# Patient Record
Sex: FEMALE | Race: WHITE | NOT HISPANIC OR LATINO | Employment: FULL TIME | URBAN - NONMETROPOLITAN AREA
[De-identification: names, ages, dates, MRNs, and addresses within clinical notes are randomized per-mention and may not be internally consistent; named-entity substitution may affect disease eponyms.]

---

## 2022-04-13 ENCOUNTER — OFFICE VISIT (OUTPATIENT)
Dept: FAMILY MEDICINE CLINIC | Facility: CLINIC | Age: 27
End: 2022-04-13

## 2022-04-13 VITALS
DIASTOLIC BLOOD PRESSURE: 102 MMHG | BODY MASS INDEX: 47.09 KG/M2 | SYSTOLIC BLOOD PRESSURE: 132 MMHG | HEIGHT: 66 IN | OXYGEN SATURATION: 98 % | WEIGHT: 293 LBS | TEMPERATURE: 97 F | HEART RATE: 110 BPM

## 2022-04-13 DIAGNOSIS — Z86.16 HISTORY OF COVID-19: ICD-10-CM

## 2022-04-13 DIAGNOSIS — E11.9 TYPE 2 DIABETES MELLITUS WITHOUT COMPLICATION, WITHOUT LONG-TERM CURRENT USE OF INSULIN: ICD-10-CM

## 2022-04-13 DIAGNOSIS — N96 HISTORY OF MULTIPLE MISCARRIAGES: ICD-10-CM

## 2022-04-13 DIAGNOSIS — J45.40 MODERATE PERSISTENT ASTHMA WITHOUT COMPLICATION: ICD-10-CM

## 2022-04-13 DIAGNOSIS — Z00.01 ENCOUNTER FOR PREVENTATIVE ADULT HEALTH CARE EXAM WITH ABNORMAL FINDINGS: Primary | ICD-10-CM

## 2022-04-13 PROCEDURE — 36415 COLL VENOUS BLD VENIPUNCTURE: CPT | Performed by: FAMILY MEDICINE

## 2022-04-13 PROCEDURE — 99385 PREV VISIT NEW AGE 18-39: CPT | Performed by: FAMILY MEDICINE

## 2022-04-13 RX ORDER — AMOXICILLIN AND CLAVULANATE POTASSIUM 875; 125 MG/1; MG/1
1 TABLET, FILM COATED ORAL EVERY 12 HOURS
COMMUNITY
Start: 2022-01-18 | End: 2022-04-13

## 2022-04-13 RX ORDER — ALBUTEROL SULFATE 90 UG/1
2 AEROSOL, METERED RESPIRATORY (INHALATION) EVERY 4 HOURS PRN
Qty: 4 G | Refills: 2 | Status: SHIPPED | OUTPATIENT
Start: 2022-04-13 | End: 2022-07-12

## 2022-04-13 RX ORDER — CEPHALEXIN 500 MG/1
CAPSULE ORAL
COMMUNITY
Start: 2022-01-15 | End: 2022-04-22

## 2022-04-13 NOTE — PROGRESS NOTES
"Chief Complaint  Annual Exam    Subjective          Priti Osman presents to [facility] for preventative yearly exam.   Patient is a 26-year-old female who presents for preventative exam and establishing care.    She is due for a Pap smear.  She also notes that in the past year she has had a total of 5 miscarriages has never had further hypercoagulability or underlying work-up done has not had any type of lupus anticoagulant or anticardiolipin antibodies done.    She had Covid a year ago and since then has had significant trouble with respiratory issues she had no prior history of asthma or breathing issues prior to having Covid.  Since then she had been evaluated initially by pulmonologist who did a PFT testing there is some concern of lung scarring and inflammation she continues to have to use her albuterol rescue inhaler would like to get reestablished with a pulmonologist.    She notes about 3 years ago she was diagnosed with what they called type 2 diabetes this was based off of an elevated hemoglobin A1c they never did any further testing to distinguish type I versus type II.  At that time she was started on oral Metformin along with some insulin she has been off of this for 6 months due to losing insurance.  Otherwise she does check her blood glucose at home runs around 200.  No admission of polydipsia, polyuria or polyphagia.  Has family history of type I and type 2 diabetes.      Objective   Vital Signs:   BP (!) 132/102   Pulse 110   Temp 97 °F (36.1 °C)   Ht 167.6 cm (66\")   Wt (!) 140 kg (309 lb 3.2 oz)   SpO2 98%   BMI 49.91 kg/m²     Body mass index is 49.91 kg/m².    Predictive Model Details   No score data available for Risk of Fall        PHQ-9 Depression Screening  Little interest or pleasure in doing things? 1-->several days   Feeling down, depressed, or hopeless? 1-->several days   Trouble falling or staying asleep, or sleeping too much? 2-->more than half " the days   Feeling tired or having little energy? 3-->nearly every day   Poor appetite or overeating? 0-->not at all   Feeling bad about yourself - or that you are a failure or have let yourself or your family down? 1-->several days   Trouble concentrating on things, such as reading the newspaper or watching television? 2-->more than half the days   Moving or speaking so slowly that other people could have noticed? Or the opposite - being so fidgety or restless that you have been moving around a lot more than usual? 1-->several days   Thoughts that you would be better off dead, or of hurting yourself in some way? 1-->several days   PHQ-9 Total Score 12   If you checked off any problems, how difficult have these problems made it for you to do your work, take care of things at home, or get along with other people? not difficult at all       There is no immunization history on file for this patient.    Review of Systems   Constitutional: Negative.    HENT: Negative.    Eyes: Negative.    Respiratory: Positive for cough.    Cardiovascular: Negative.    Gastrointestinal: Negative.    Endocrine: Negative.    Genitourinary: Negative.    Musculoskeletal: Negative.    Skin: Negative.    Allergic/Immunologic: Negative.    Neurological: Negative.    Hematological: Negative.    Psychiatric/Behavioral: Negative.        Past History:  Medical History: has a past medical history of ADHD, Anxiety, Diabetes, gestational, Insomnia, Neurotic depression, and Obesity.   Surgical History: has a past surgical history that includes Tonsillectomy.   Family History: family history includes ADD / ADHD in her father; Alcohol abuse in her father; Allergies in her sister; Alzheimer's disease in an other family member; Asthma in an other family member; Cancer in an other family member; Clotting disorder in her mother; Depression in her father, mother, sister, and another family member; Diabetes in her mother and another family member; Mental  illness in her father and another family member; Obesity in her mother and another family member; Stroke in an other family member.   Social History: reports that she has never smoked. She has never used smokeless tobacco. She reports previous alcohol use. She reports that she does not use drugs.      Current Outpatient Medications:   •  cephalexin (KEFLEX) 500 MG capsule, TAKE 1 CAPSULE 4 TIMES A DAY, Disp: , Rfl:   •  mupirocin (BACTROBAN) 2 % ointment, APPLY TO AFFECTED AREA(S) 3 TIMES A DAY, Disp: , Rfl:   •  albuterol sulfate  (90 Base) MCG/ACT inhaler, Inhale 2 puffs Every 4 (Four) Hours As Needed for Wheezing., Disp: 4 g, Rfl: 2    Allergies: Patient has no known allergies.    Physical Exam  Constitutional:       Appearance: Normal appearance. She is normal weight.   HENT:      Head: Normocephalic and atraumatic.   Eyes:      Conjunctiva/sclera: Conjunctivae normal.   Cardiovascular:      Rate and Rhythm: Normal rate and regular rhythm.   Pulmonary:      Effort: Pulmonary effort is normal.      Breath sounds: Normal breath sounds.   Musculoskeletal:         General: Normal range of motion.      Cervical back: Normal range of motion and neck supple.   Skin:     General: Skin is warm and dry.   Neurological:      General: No focal deficit present.      Mental Status: She is alert and oriented to person, place, and time. Mental status is at baseline.   Psychiatric:         Mood and Affect: Mood normal.         Behavior: Behavior normal.         Thought Content: Thought content normal.         Judgment: Judgment normal.          Result Review :              Counseling/anticipatory guidance:  Patient has been counseled on nutrition, [place] planning/contraception, physical activity, health and weight, injury prevention, misuse of tobacco, alcohol and drugs, sexual behavior, dental health, mental health, immunizations and screening.  Patient has been given counseling and guidance on the importance of breast  cancer and self breast exams.     Assessment and Plan    Diagnoses and all orders for this visit:    1. Encounter for preventative adult health care exam with abnormal findings (Primary)  We will get basic blood work and follow-up she is also due for a Pap smear  -     CBC Auto Differential; Future  -     CK; Future  -     Comprehensive Metabolic Panel; Future  -     Hemoglobin A1c; Future  -     TSH; Future  -     Lipid Panel; Future  -     T4, Free; Future  -     T4, Free  -     Lipid Panel  -     TSH  -     Hemoglobin A1c  -     Comprehensive Metabolic Panel  -     CK  -     CBC Auto Differential    2. Type 2 diabetes mellitus without complication, without long-term current use of insulin (HCC)  -     C-Peptide; Future  -     Glutamic Acid Decarboxylase; Future  -     Glutamic Acid Decarboxylase  -     C-Peptide  I have discussed with her with her young age there is a need to rule out type I versus type 2 diabetes we will get some further blood work we will get an A1c and follow-up at her next appointment.    3. Moderate persistent asthma without complication  -     XR Chest PA & Lateral; Future  No evidence of respiratory distress or wheezing but will get a chest x-ray and follow-up.  Otherwise ProAir albuterol inhaler has been provided.    4. History of COVID-19    5. History of multiple miscarriages  -     Lupus Anticoagulant; Future  -     Lupus Anticoagulant  Work-up with lupus anticoagulant blood work and follow-up    Other orders  -     albuterol sulfate  (90 Base) MCG/ACT inhaler; Inhale 2 puffs Every 4 (Four) Hours As Needed for Wheezing.  Dispense: 4 g; Refill: 2        PATIENT HAS BEEN ADVISED WHILE ON NEW MEDICATION PRESCRIBED IT IS IMPORTANT TO USE BACK UP CONTRACEPTION OR BACK UP BIRTH CONTROL AS THE USE OF THIS MEDICATION WITH PREGNANCY COULD CAUSE POTENTIAL RISKS IF PATIENT DOES BECOME PREGNANT.    MEDICATION SIDE EFFECTS AND RISKS HAVE BEEN DISCUSSED WITH PATIENT. PATIENT NOTES  UNDERSTANDING. IF ANY CONCERN OR QUESTION REGARDING MEDICATION PLEASE CONTACT.       Follow Up   No follow-ups on file.  Patient was given instructions and counseling regarding her condition or for health maintenance advice. Please see specific information pulled into the AVS if appropriate.     Angle Yoo DO

## 2022-04-14 ENCOUNTER — TELEMEDICINE (OUTPATIENT)
Dept: FAMILY MEDICINE CLINIC | Facility: CLINIC | Age: 27
End: 2022-04-14

## 2022-04-14 DIAGNOSIS — E78.2 MIXED HYPERLIPIDEMIA: ICD-10-CM

## 2022-04-14 DIAGNOSIS — F90.2 ATTENTION DEFICIT HYPERACTIVITY DISORDER (ADHD), COMBINED TYPE: ICD-10-CM

## 2022-04-14 DIAGNOSIS — E11.9 TYPE 2 DIABETES MELLITUS WITHOUT COMPLICATION, WITHOUT LONG-TERM CURRENT USE OF INSULIN: Primary | ICD-10-CM

## 2022-04-14 LAB
ALBUMIN SERPL-MCNC: 4.4 G/DL (ref 3.9–5)
ALBUMIN/GLOB SERPL: 1.4 {RATIO} (ref 1.2–2.2)
ALP SERPL-CCNC: 103 IU/L (ref 44–121)
ALT SERPL-CCNC: 21 IU/L (ref 0–32)
AST SERPL-CCNC: 17 IU/L (ref 0–40)
BASOPHILS # BLD AUTO: 0.1 X10E3/UL (ref 0–0.2)
BASOPHILS NFR BLD AUTO: 1 %
BILIRUB SERPL-MCNC: 0.8 MG/DL (ref 0–1.2)
BUN SERPL-MCNC: 9 MG/DL (ref 6–20)
BUN/CREAT SERPL: 22 (ref 9–23)
C PEPTIDE SERPL-MCNC: 3.6 NG/ML (ref 1.1–4.4)
CALCIUM SERPL-MCNC: 9.4 MG/DL (ref 8.7–10.2)
CHLORIDE SERPL-SCNC: 97 MMOL/L (ref 96–106)
CHOLEST SERPL-MCNC: 197 MG/DL (ref 100–199)
CK SERPL-CCNC: 51 U/L (ref 32–182)
CO2 SERPL-SCNC: 20 MMOL/L (ref 20–29)
CREAT SERPL-MCNC: 0.41 MG/DL (ref 0.57–1)
EGFRCR SERPLBLD CKD-EPI 2021: 141 ML/MIN/1.73
EOSINOPHIL # BLD AUTO: 0.1 X10E3/UL (ref 0–0.4)
EOSINOPHIL NFR BLD AUTO: 1 %
ERYTHROCYTE [DISTWIDTH] IN BLOOD BY AUTOMATED COUNT: 13 % (ref 11.7–15.4)
GLOBULIN SER CALC-MCNC: 3.1 G/DL (ref 1.5–4.5)
GLUCOSE SERPL-MCNC: 270 MG/DL (ref 65–99)
HBA1C MFR BLD: 10.3 % (ref 4.8–5.6)
HCT VFR BLD AUTO: 41.9 % (ref 34–46.6)
HDLC SERPL-MCNC: 39 MG/DL
HGB BLD-MCNC: 13.9 G/DL (ref 11.1–15.9)
IMM GRANULOCYTES # BLD AUTO: 0.1 X10E3/UL (ref 0–0.1)
IMM GRANULOCYTES NFR BLD AUTO: 1 %
LDLC SERPL CALC-MCNC: 118 MG/DL (ref 0–99)
LYMPHOCYTES # BLD AUTO: 2.3 X10E3/UL (ref 0.7–3.1)
LYMPHOCYTES NFR BLD AUTO: 22 %
MCH RBC QN AUTO: 29.2 PG (ref 26.6–33)
MCHC RBC AUTO-ENTMCNC: 33.2 G/DL (ref 31.5–35.7)
MCV RBC AUTO: 88 FL (ref 79–97)
MONOCYTES # BLD AUTO: 0.4 X10E3/UL (ref 0.1–0.9)
MONOCYTES NFR BLD AUTO: 3 %
NEUTROPHILS # BLD AUTO: 7.8 X10E3/UL (ref 1.4–7)
NEUTROPHILS NFR BLD AUTO: 72 %
PLATELET # BLD AUTO: 250 X10E3/UL (ref 150–450)
POTASSIUM SERPL-SCNC: 4.3 MMOL/L (ref 3.5–5.2)
PROT SERPL-MCNC: 7.5 G/DL (ref 6–8.5)
RBC # BLD AUTO: 4.76 X10E6/UL (ref 3.77–5.28)
SODIUM SERPL-SCNC: 135 MMOL/L (ref 134–144)
T4 FREE SERPL-MCNC: 1.37 NG/DL (ref 0.82–1.77)
TRIGL SERPL-MCNC: 226 MG/DL (ref 0–149)
TSH SERPL DL<=0.005 MIU/L-ACNC: 1.74 UIU/ML (ref 0.45–4.5)
VLDLC SERPL CALC-MCNC: 40 MG/DL (ref 5–40)
WBC # BLD AUTO: 10.7 X10E3/UL (ref 3.4–10.8)

## 2022-04-14 PROCEDURE — 99214 OFFICE O/P EST MOD 30 MIN: CPT | Performed by: FAMILY MEDICINE

## 2022-04-14 RX ORDER — BUPROPION HYDROCHLORIDE 150 MG/1
150 TABLET ORAL DAILY
Qty: 30 TABLET | Refills: 0 | Status: SHIPPED | OUTPATIENT
Start: 2022-04-14 | End: 2022-10-21

## 2022-04-14 NOTE — PROGRESS NOTES
Telehealth E-Visit      Date: 2022   Patient Name: Priti Osman  : 1995   MRN: [id number]     Chief Complaint:  No chief complaint on file.    Telehealth Visit completed via  video conferencing, patient here for telehealth visit. Patient understands the limitations of the visit with telehealth given limitations in the exam findings but patient is agreeable to this telemedicine visit due to concerns with COVID 19 exposure if patient were to be present at the office at this time    I have reviewed the E-Visit questionnaire and the patient's answers, my assessment and plan are listed below.     History of Present Illness: Priti Osman is a 26 y.o. female who presents for blood work follow-up.  She had previously been told that she was a diabetic was unsure if it was type I versus type II but had been on metformin and other injectables in the past.  She does try to check her blood glucose daily around 200 but was not aware of what her last hemoglobin A1c was.    She also notes as a child she was diagnosed with ADHD is unsure what medication she was on she struggles with some depression and ADHD like symptoms now with some anxiety no SI or HI noted had previously been on Wellbutrin did well with this.      Subjective      Review of Systems:   Review of Systems   Constitutional: Negative.    HENT: Negative.    Eyes: Negative.    Respiratory: Negative.    Cardiovascular: Negative.    Gastrointestinal: Negative.    Endocrine: Negative.    Genitourinary: Negative.    Musculoskeletal: Negative.    Skin: Negative.    Allergic/Immunologic: Negative.    Neurological: Negative.    Hematological: Negative.    Psychiatric/Behavioral: Negative.  Positive for positive for hyperactivity and depressed mood.       I have reviewed and the following portions of the patient's history were updated as appropriate: past family history, past medical history, past social history, past surgical history and problem  list.    Medications:     Current Outpatient Medications:   •  albuterol sulfate  (90 Base) MCG/ACT inhaler, Inhale 2 puffs Every 4 (Four) Hours As Needed for Wheezing., Disp: 4 g, Rfl: 2  •  buPROPion XL (Wellbutrin XL) 150 MG 24 hr tablet, Take 1 tablet by mouth Daily., Disp: 30 tablet, Rfl: 0  •  cephalexin (KEFLEX) 500 MG capsule, TAKE 1 CAPSULE 4 TIMES A DAY, Disp: , Rfl:   •  metFORMIN (GLUCOPHAGE) 1000 MG tablet, Take 1 tablet by mouth 2 (Two) Times a Day With Meals., Disp: 60 tablet, Rfl: 5  •  mupirocin (BACTROBAN) 2 % ointment, APPLY TO AFFECTED AREA(S) 3 TIMES A DAY, Disp: , Rfl:     Allergies:   No Known Allergies    Objective     Physical Exam:  Vital Signs: There were no vitals filed for this visit.  There is no height or weight on file to calculate BMI.    Physical Exam  Vitals (EXAM IS DONE AND LIMTIED TO TELEMEDICINE DUE TO COVID) reviewed.           Assessment / Plan      Assessment/Plan:   Diagnoses and all orders for this visit:    1. Type 2 diabetes mellitus without complication, without long-term current use of insulin (HCC) (Primary)  I have discussed with patient it appears that her beta glutamic acid level is within normal limits indicating that she likely has type 2 diabetes for now with her young age we will also get her established with endocrinology to get established.  In the meantime I will get her on metformin 1000 mg twice daily and I have also given her samples of Rybelsus to try we will try to approach this without having to use insulin with the GLP-1 approach have told her to keep a good blood glucose log daily twice daily return in 1 months for evaluation.  Otherwise return precautions have been provided glycemic control index range has been provided as well.    2. Mixed hyperlipidemia  For now we will consider adding a statin at the next appointment however have strongly recommended diet and exercise.    3. Attention deficit hyperactivity disorder (ADHD), combined  type  We will start her on trial of Wellbutrin return precautions provided      Other orders  -     buPROPion XL (Wellbutrin XL) 150 MG 24 hr tablet; Take 1 tablet by mouth Daily.  Dispense: 30 tablet; Refill: 0  -     metFORMIN (GLUCOPHAGE) 1000 MG tablet; Take 1 tablet by mouth 2 (Two) Times a Day With Meals.  Dispense: 60 tablet; Refill: 5         Follow Up:   No follow-ups on file.    PATIENT HAS BEEN ADVISED WHILE ON NEW MEDICATION PRESCRIBED IT IS IMPORTANT TO USE BACK UP CONTRACEPTION OR BACK UP BIRTH CONTROL AS THE USE OF THIS MEDICATION WITH PREGNANCY COULD CAUSE POTENTIAL RISKS IF PATIENT DOES BECOME PREGNANT.    MEDICATION SIDE EFFECTS AND RISKS HAVE BEEN DISCUSSED WITH PATIENT. PATIENT NOTES UNDERSTANDING. IF ANY CONCERN OR QUESTION REGARDING MEDICATION PLEASE CONTACT.     50 minutes were spent reviewing the patient's questionnaire, formulating a treatment plan, and relaying information to the patient via TweetPhotohart.    Angle Yoo DO  [facility]   04/14/22  10:37 EDT

## 2022-04-15 ENCOUNTER — TELEPHONE (OUTPATIENT)
Dept: FAMILY MEDICINE CLINIC | Facility: CLINIC | Age: 27
End: 2022-04-15

## 2022-04-15 DIAGNOSIS — R76.0 LUPUS ANTICOAGULANT POSITIVE: Primary | ICD-10-CM

## 2022-04-15 LAB
APTT SCREEN TO CONFIRM RATIO: 1.17 RATIO (ref 0–1.34)
CONFIRM APTT/NORMAL: 43 SEC (ref 0–47.6)
DRVVT SCREEN TO CONFIRM RATIO: 1.4 RATIO (ref 0.8–1.2)
GAD65 AB SER IA-ACNC: <5 U/ML (ref 0–5)
LA 2 SCREEN W REFLEX-IMP: ABNORMAL
MIXING DRVVT: 42.4 SEC (ref 0–40.4)
SCREEN APTT: 28.3 SEC (ref 0–51.9)
SCREEN DRVVT: 49.1 SEC (ref 0–47)
THROMBIN TIME: 15.3 SEC (ref 0–23)

## 2022-04-15 NOTE — TELEPHONE ENCOUNTER
patient called and would like to know if the diabetic samples are at the  so that she can send her  to pick them up. Please call to let her know. Ph: [phone]

## 2022-04-15 NOTE — TELEPHONE ENCOUNTER
Called pt to inform her of her samples being up fornt and she said her  will  her meds on 04/16/2022

## 2022-04-18 ENCOUNTER — TELEPHONE (OUTPATIENT)
Dept: FAMILY MEDICINE CLINIC | Facility: CLINIC | Age: 27
End: 2022-04-18

## 2022-04-18 NOTE — TELEPHONE ENCOUNTER
Patient sent me a message she states she has tried to call her office multiple different times.  Can 70 please call her and notify her that the blood work results that were abnormal were her lupus anticoagulant antibody which could be the reason why she had numerous different spontaneous miscarriages we are currently working on getting her into hematology thank you

## 2022-04-18 NOTE — TELEPHONE ENCOUNTER
----- Message from Priti Osman sent at 4/18/2022 12:07 PM EDT -----  Regarding: Priti Osman   I'm trying to call I missed a call for test results. I have called many times and can't get anyone on the phone

## 2022-04-19 ENCOUNTER — TELEPHONE (OUTPATIENT)
Dept: FAMILY MEDICINE CLINIC | Facility: CLINIC | Age: 27
End: 2022-04-19

## 2022-04-22 ENCOUNTER — OFFICE VISIT (OUTPATIENT)
Dept: FAMILY MEDICINE CLINIC | Facility: CLINIC | Age: 27
End: 2022-04-22

## 2022-04-22 VITALS
DIASTOLIC BLOOD PRESSURE: 90 MMHG | HEART RATE: 98 BPM | HEIGHT: 66 IN | WEIGHT: 293 LBS | SYSTOLIC BLOOD PRESSURE: 122 MMHG | OXYGEN SATURATION: 98 % | BODY MASS INDEX: 47.09 KG/M2

## 2022-04-22 DIAGNOSIS — E11.65 TYPE 2 DIABETES MELLITUS WITH HYPERGLYCEMIA, WITHOUT LONG-TERM CURRENT USE OF INSULIN: Primary | ICD-10-CM

## 2022-04-22 DIAGNOSIS — R76.0 LUPUS ANTICOAGULANT POSITIVE: ICD-10-CM

## 2022-04-22 PROBLEM — E66.01 MORBID OBESITY: Status: ACTIVE | Noted: 2022-04-22

## 2022-04-22 LAB
POC CREATININE URINE: 50
POC MICROALBUMIN URINE: 50

## 2022-04-22 PROCEDURE — 99213 OFFICE O/P EST LOW 20 MIN: CPT | Performed by: FAMILY MEDICINE

## 2022-04-22 PROCEDURE — 82044 UR ALBUMIN SEMIQUANTITATIVE: CPT | Performed by: FAMILY MEDICINE

## 2022-04-22 RX ORDER — CEFDINIR 300 MG/1
CAPSULE ORAL
COMMUNITY
Start: 2022-04-12 | End: 2022-04-22

## 2022-04-22 NOTE — PROGRESS NOTES
Answers for HPI/ROS submitted by the patient on 4/20/2022  What is the primary reason for your visit?: Diabetes    Chief Complaint  Follow-up    Subjective          Priti Osman presents to [facility]  Patient is 26-year-old female who presents for follow-up on her blood work.  At her last appointment she was told she in the past a type 2 diabetes had previously been on metformin.  She since her last appointment has been on the Rybelsus sample and notes improvement of her blood glucose monitoring still around 200s on average and lowest is in the mid 100s.  No polydipsia polyuria.  She otherwise is doing well    She is very hesitant about starting insulin does not want to do so wants to find other methods with dietary control would like to be referred over to bariatric surgery as well to help from a weight loss standpoint    Patient has had 5 previous miscarriages she has never had a work-up but is aware that we recently found her to have positive lupus anticoagulant has an appointment with hematology is currently on birth control not wanting to get pregnant until after her appointment    Diabetes  She has type 2 diabetes mellitus. No MedicAlert identification noted. The initial diagnosis of diabetes was made 5 years ago. Hypoglycemia symptoms include headaches, mood changes, nervousness/anxiousness and sleepiness. Pertinent negatives for hypoglycemia include no confusion, dizziness, hunger, pallor, seizures, speech difficulty, sweats or tremors. Associated symptoms include fatigue, polydipsia, polyuria and weight loss. Pertinent negatives for diabetes include no blurred vision, no chest pain, no foot paresthesias, no foot ulcerations, no polyphagia, no visual change and no weakness. Hypoglycemia complications include hospitalization. Pertinent negatives for hypoglycemia complications include no blackouts, no nocturnal hypoglycemia, no required assistance and no required glucagon  "injection. Symptoms are improving. Pertinent negatives for diabetic complications include no CVA, heart disease, impotence, nephropathy, peripheral neuropathy, PVD or retinopathy. Risk factors for coronary artery disease include family history and obesity. Current diabetic treatment includes diet and oral agent (dual therapy). She is compliant with treatment all of the time. She has not had a previous visit with a dietitian. She monitors blood glucose at home 1-2 x per day. She monitors urine at home <1 x per month. Blood glucose monitoring compliance is good. She does not see a podiatrist.Eye exam is current.       Objective   Vital Signs:   /90   Pulse 98   Ht 167.6 cm (66\")   Wt (!) 142 kg (314 lb)   SpO2 98%   BMI 50.68 kg/m²     Body mass index is 50.68 kg/m².    Review of Systems   Constitutional: Positive for fatigue and unexpected weight loss.   Eyes: Negative for blurred vision.   Cardiovascular: Negative for chest pain.   Endocrine: Positive for polydipsia and polyuria. Negative for polyphagia.   Genitourinary: Negative for impotence.   Skin: Negative for pallor.   Neurological: Negative for dizziness, tremors, seizures, speech difficulty, weakness and confusion.   Psychiatric/Behavioral: The patient is nervous/anxious.        Past History:  Medical History: has a past medical history of ADHD, Anxiety, Diabetes, gestational, Insomnia, Neurotic depression, and Obesity.   Surgical History: has a past surgical history that includes Tonsillectomy.   Family History: family history includes ADD / ADHD in her father; Alcohol abuse in her father; Allergies in her sister; Alzheimer's disease in an other family member; Asthma in an other family member; Cancer in an other family member; Clotting disorder in her mother; Depression in her father, mother, sister, and another family member; Diabetes in her mother and another family member; Mental illness in her father and another family member; Obesity in her " "mother and another family member; Stroke in an other family member.   Social History: reports that she has never smoked. She has never used smokeless tobacco. She reports previous alcohol use. She reports that she does not use drugs.      Current Outpatient Medications:   •  albuterol sulfate  (90 Base) MCG/ACT inhaler, Inhale 2 puffs Every 4 (Four) Hours As Needed for Wheezing., Disp: 4 g, Rfl: 2  •  buPROPion XL (Wellbutrin XL) 150 MG 24 hr tablet, Take 1 tablet by mouth Daily., Disp: 30 tablet, Rfl: 0  •  metFORMIN (GLUCOPHAGE) 1000 MG tablet, Take 1 tablet by mouth 2 (Two) Times a Day With Meals., Disp: 60 tablet, Rfl: 5    Allergies: Patient has no known allergies.    Physical Exam     Result Review :                   Assessment and Plan    Diagnoses and all orders for this visit:    1. Type 2 diabetes mellitus with hyperglycemia, without long-term current use of insulin (HCC) (Primary)  -     POCT microalbumin  -     Ambulatory Referral to Endocrinology  Stable for now her beta glutamic hydroxy acetate is within normal limits indicating it is likely type 2 diabetes however with her young age I will go ahead and refer her to endocrinology she is really distant about starting any form of insulin does not want to do so.  For now she wants to continue the Rybelsus have agreed to do so keep a \"close blood glucose monitor follow-up in 1 month if there is any evidence of persistent hyperglycemia she needs to return for evaluation ASAP otherwise we will continue to follow along    2. Lupus anticoagulant positive  Is established with hematology follow-up with us after appointment          Follow Up   No follow-ups on file.  Patient was given instructions and counseling regarding her condition or for health maintenance advice. Please see specific information pulled into the AVS if appropriate.     Angle Yoo DO  "

## 2022-04-29 ENCOUNTER — TELEMEDICINE (OUTPATIENT)
Dept: FAMILY MEDICINE CLINIC | Facility: CLINIC | Age: 27
End: 2022-04-29

## 2022-04-29 DIAGNOSIS — R42 DIZZINESS: Primary | ICD-10-CM

## 2022-04-29 DIAGNOSIS — E11.9 TYPE 2 DIABETES MELLITUS WITHOUT COMPLICATION, WITHOUT LONG-TERM CURRENT USE OF INSULIN: ICD-10-CM

## 2022-04-29 PROCEDURE — 99215 OFFICE O/P EST HI 40 MIN: CPT | Performed by: FAMILY MEDICINE

## 2022-04-29 RX ORDER — CHLORCYCLIZINE HYDROCHLORIDE AND PSEUDOEPHEDRINE HYDROCHLORIDE 25; 60 MG/1; MG/1
1 TABLET ORAL DAILY
Qty: 7 TABLET | Refills: 0 | Status: SHIPPED | OUTPATIENT
Start: 2022-04-29 | End: 2022-05-16 | Stop reason: ALTCHOICE

## 2022-04-29 NOTE — PROGRESS NOTES
Telehealth E-Visit      Date: 2022   Patient Name: Priti Osman  : 1995   MRN: [id number]     Chief Complaint:    Chief Complaint   Patient presents with   • Dizziness     Telehealth Visit completed via My Chart for video conferencing, patient here for telehealth visit. Patient understands the limitations of the visit with telehealth given limitations in the exam findings but patient is agreeable to this telemedicine visit due to concerns with COVID 19 exposure if patient were to be present at the office at this time    I have reviewed the E-Visit questionnaire and the patient's answers, my assessment and plan are listed below.     History of Present Illness: Priti Osman is a 26 y.o. female who is complaining of waking up this morning she went to work on her normal route as she was driving she had an episode of what she describes as dizziness and lightheaded she almost passed out.  She had to pull over.  20 minutes after she went home checked her blood sugar it was 190.  She also had her blood pressure checked at home it was 121/75.  She has had intermittent episodes of this in the past but they usually resolve still continues to feel this way no chest pain or shortness of breath she has a mild headache with some vertigo as well had a little bit of nausea unsure of beginning stages of URI.  No fever.  She did get started on Rybelsus about a week ago but she has been taking it daily and has tolerated this well otherwise.  No history of true syncope, seizures any other cardiac abnormalities noted.      Subjective      Review of Systems:   Review of Systems   Constitutional: Negative.    HENT: Negative.    Eyes: Negative.    Respiratory: Negative.    Cardiovascular: Negative.    Gastrointestinal: Negative.    Endocrine: Negative.    Genitourinary: Negative.    Musculoskeletal: Negative.    Skin: Negative.    Allergic/Immunologic: Negative.    Neurological: Positive for dizziness and  light-headedness.   Hematological: Negative.    Psychiatric/Behavioral: Negative.         I have reviewed and the following portions of the patient's history were updated as appropriate: past family history, past medical history, past social history, past surgical history and problem list.    Medications:     Current Outpatient Medications:   •  albuterol sulfate  (90 Base) MCG/ACT inhaler, Inhale 2 puffs Every 4 (Four) Hours As Needed for Wheezing., Disp: 4 g, Rfl: 2  •  buPROPion XL (Wellbutrin XL) 150 MG 24 hr tablet, Take 1 tablet by mouth Daily., Disp: 30 tablet, Rfl: 0  •  metFORMIN (GLUCOPHAGE) 1000 MG tablet, Take 1 tablet by mouth 2 (Two) Times a Day With Meals., Disp: 60 tablet, Rfl: 5  •  Chlorcyclizine-Pseudoephed (Stahist AD) 25-60 MG tablet, Take 1 each by mouth Daily., Disp: 7 tablet, Rfl: 0    Allergies:   No Known Allergies    Objective     Physical Exam:  Vital Signs: There were no vitals filed for this visit.  There is no height or weight on file to calculate BMI.    Physical Exam  Vitals reviewed: Exam was done and limited to telemedicine due to COVID.           Assessment / Plan      Assessment/Plan:   Diagnoses and all orders for this visit:    1. Dizziness (Primary)  I have had discussion with patient.  For now the most common causes especially with her history would be hypoglycemia or hypotension.  She notes her blood sugar has been okay along with her blood pressure.  Recommendations and if there always if you have presyncopal episode dizziness to go directly to the ER however if she does not want to do this we can manage outpatient.  She has been told to push fluids this could be an indication of acute dehydration.  If she has any chest pain or shortness of breath still instructed to go to the ER this could be the beginning of a viral URI could indicate some eustachian tube dysfunction especially with pressure in the ears we will give her a course of Stahist to see if this could  potentially dry out any fluid in the inner ear that could be contributing to the dizziness or vertigo-like symptoms she however has been given strict return precautions otherwise.  Recommended to stay home and not drive until her dizziness has resolved.    2. Type 2 diabetes mellitus without complication, without long-term current use of insulin (HCC)  Stable continue meds and monitoring for now  Other orders  -     Chlorcyclizine-Pseudoephed (Stahist AD) 25-60 MG tablet; Take 1 each by mouth Daily.  Dispense: 7 tablet; Refill: 0         Follow Up:   No follow-ups on file.    PATIENT HAS BEEN ADVISED WHILE ON NEW MEDICATION PRESCRIBED IT IS IMPORTANT TO USE BACK UP CONTRACEPTION OR BACK UP BIRTH CONTROL AS THE USE OF THIS MEDICATION WITH PREGNANCY COULD CAUSE POTENTIAL RISKS IF PATIENT DOES BECOME PREGNANT.    MEDICATION SIDE EFFECTS AND RISKS HAVE BEEN DISCUSSED WITH PATIENT. PATIENT NOTES UNDERSTANDING. IF ANY CONCERN OR QUESTION REGARDING MEDICATION PLEASE CONTACT.     45 minutes were spent reviewing the patient's questionnaire, formulating a treatment plan, and relaying information to the patient via [email or web address].    Angle Yoo DO  [facility]   04/29/22  11:46 EDT

## 2022-05-16 ENCOUNTER — OFFICE VISIT (OUTPATIENT)
Dept: PULMONOLOGY | Facility: CLINIC | Age: 27
End: 2022-05-16

## 2022-05-16 VITALS
HEART RATE: 85 BPM | OXYGEN SATURATION: 98 % | BODY MASS INDEX: 47.09 KG/M2 | SYSTOLIC BLOOD PRESSURE: 136 MMHG | WEIGHT: 293 LBS | DIASTOLIC BLOOD PRESSURE: 82 MMHG | HEIGHT: 66 IN | TEMPERATURE: 97.8 F

## 2022-05-16 DIAGNOSIS — R06.02 SHORTNESS OF BREATH: Primary | ICD-10-CM

## 2022-05-16 DIAGNOSIS — Z86.16 HISTORY OF COVID-19: ICD-10-CM

## 2022-05-16 PROCEDURE — 99204 OFFICE O/P NEW MOD 45 MIN: CPT | Performed by: INTERNAL MEDICINE

## 2022-05-16 RX ORDER — ORAL SEMAGLUTIDE 3 MG/1
TABLET ORAL
COMMUNITY
End: 2022-07-12

## 2022-05-16 RX ORDER — CHOLECALCIFEROL (VITAMIN D3) 125 MCG
10 CAPSULE ORAL
COMMUNITY

## 2022-05-16 NOTE — PROGRESS NOTES
New Patient Pulmonary Office Visit      Patient Name: Priti Osman    Referring Physician: Angle Yoo DO    Chief Complaint:  No chief complaint on file.      History of Present Illness: Priti Osman is a 27 y.o. female who is here today to establish care with Pulmonary.  Patient is a past medical history significant for diabetes mellitus and COVID-19.  He was referred to pulmonary for evaluation of shortness of breath.  She started having trouble after she had COVID-19 roughly a year ago and was seen by pulmonologist at that time did PFTs and was going to get some chest imaging but patient lost her insurance therefore never got them. She was initially given albuterol.  She says that she is using albuterol over the course of the last year and has slowly improved.  She still utilizes it a couple times per week more frequently and she is at work that when she is around the house.  She denies any chest pain, nausea, fever, or chills.  She has no other acute complaints.    Review of Systems:   Review of Systems   Constitutional: Negative for activity change, appetite change, chills, diaphoresis, fatigue and fever.   HENT: Negative for congestion, postnasal drip, sinus pressure and voice change.    Eyes: Negative for blurred vision.   Respiratory: Positive for shortness of breath. Negative for cough and wheezing.    Cardiovascular: Negative for chest pain.   Gastrointestinal: Negative for abdominal pain.   Musculoskeletal: Negative for myalgias.   Skin: Negative for color change and dry skin.   Allergic/Immunologic: Negative for environmental allergies.   Neurological: Negative for weakness and confusion.   Hematological: Negative for adenopathy.   Psychiatric/Behavioral: Negative for agitation, sleep disturbance and depressed mood.       Past Medical History:   Past Medical History:   Diagnosis Date   • ADHD    • Anxiety    • Diabetes, gestational    • Insomnia    • Neurotic depression    • Obesity   "      Past Surgical History:   Past Surgical History:   Procedure Laterality Date   • TONSILLECTOMY         Family History:   Family History   Problem Relation Age of Onset   • Diabetes Mother    • Obesity Mother    • Depression Mother    • Clotting disorder Mother    • ADD / ADHD Father    • Depression Father    • Mental illness Father    • Alcohol abuse Father    • Allergies Sister    • Depression Sister    • Cancer Other    • Mental illness Other    • Diabetes Other    • Stroke Other    • Alzheimer's disease Other    • Asthma Other    • Depression Other    • Obesity Other        Social History:   Social History     Socioeconomic History   • Marital status:    Tobacco Use   • Smoking status: Never Smoker   • Smokeless tobacco: Never Used   Substance and Sexual Activity   • Alcohol use: Not Currently   • Drug use: Never   • Sexual activity: Yes     Partners: Male       Medications:     Current Outpatient Medications:   •  albuterol sulfate  (90 Base) MCG/ACT inhaler, Inhale 2 puffs Every 4 (Four) Hours As Needed for Wheezing., Disp: 4 g, Rfl: 2  •  buPROPion XL (Wellbutrin XL) 150 MG 24 hr tablet, Take 1 tablet by mouth Daily., Disp: 30 tablet, Rfl: 0  •  melatonin 5 MG tablet tablet, Take 10 mg by mouth., Disp: , Rfl:   •  metFORMIN (GLUCOPHAGE) 1000 MG tablet, Take 1 tablet by mouth 2 (Two) Times a Day With Meals., Disp: 60 tablet, Rfl: 5  •  Semaglutide (Rybelsus) 3 MG tablet, Take  by mouth., Disp: , Rfl:     Allergies:   No Known Allergies    Physical Exam:  Vital Signs:   Vitals:    05/16/22 1438   BP: 136/82   Pulse: 85   Temp: 97.8 °F (36.6 °C)   SpO2: 98%  Comment: resting, room air   Weight: (!) 144 kg (317 lb)   Height: 167.6 cm (66\")       Physical Exam  Vitals and nursing note reviewed.   Constitutional:       General: She is not in acute distress.     Appearance: She is well-developed and normal weight. She is not ill-appearing or toxic-appearing.   HENT:      Head: Normocephalic and " atraumatic.   Cardiovascular:      Rate and Rhythm: Normal rate and regular rhythm.      Pulses: Normal pulses.      Heart sounds: Normal heart sounds. No murmur heard.    No friction rub. No gallop.   Pulmonary:      Effort: Pulmonary effort is normal. No respiratory distress.      Breath sounds: Normal breath sounds. No wheezing, rhonchi or rales.   Musculoskeletal:      Right lower leg: No edema.      Left lower leg: No edema.   Skin:     General: Skin is warm and dry.   Neurological:      Mental Status: She is alert and oriented to person, place, and time.           There is no immunization history on file for this patient.    Results Review:   - I personally reviewed the pts imaging from chest x-ray from 4/13/2022 shows no acute cardiopulmonary process  - I personally reviewed the pts chart with regards to evaluation by her PCP on multiple occasions for diabetes in addition to shortness of breath.    Assessment / Plan:   1. Shortness of breath (Primary)  2. History of COVID-19  -All the patient's symptoms began whenever she got COVID-19 pneumonia roughly a year to a year and a half ago.  And has some improvement with albuterol.  Very likely she has some level of asthma post COVID.  But for now seems to be fairly well controlled.  I want her to continue on the albuterol on a as needed basis.  I will get further testing to the define the shortness of breath and be sure that there is nothing else going on.  I explained to her that for now chest x-ray was reviewed from April which showed no acute cardiopulmonary process.  So if there is any scarring it is very minimal.  I would like to get her PFTs before ordering any CT scans.  If the PFTs show concern for possible scarring then we may eventually need one but for now I do not think it is necessary.  She has verbalized understanding of this and agreed with the plan  -PFTs and 6-minute walk test ordered for the next visit.    Follow Up:   Return in about 4 weeks  (around 6/13/2022) for 6-minute walk test, PFTs.     YECENIA Pillai, DO  Pulmonary and Critical Care Medicine  Note Electronically Signed    Part of this note may be an electronic transcription/translation of spoken language to printed text using the Dragon Dictation System.

## 2022-06-07 ENCOUNTER — CONSULT (OUTPATIENT)
Dept: ONCOLOGY | Facility: CLINIC | Age: 27
End: 2022-06-07

## 2022-06-07 VITALS
HEIGHT: 66 IN | DIASTOLIC BLOOD PRESSURE: 78 MMHG | TEMPERATURE: 98.6 F | OXYGEN SATURATION: 98 % | RESPIRATION RATE: 18 BRPM | HEART RATE: 97 BPM | SYSTOLIC BLOOD PRESSURE: 148 MMHG | BODY MASS INDEX: 47.09 KG/M2 | WEIGHT: 293 LBS

## 2022-06-07 DIAGNOSIS — Z87.59: Primary | ICD-10-CM

## 2022-06-07 DIAGNOSIS — R76.0 LUPUS ANTICOAGULANT POSITIVE: ICD-10-CM

## 2022-06-07 PROCEDURE — 99204 OFFICE O/P NEW MOD 45 MIN: CPT | Performed by: INTERNAL MEDICINE

## 2022-06-07 NOTE — PROGRESS NOTES
CHIEF COMPLAINT: Lupus anticoagulant    REASON FOR REFERRAL: Same      RECORDS OBTAINED  Records of the patients history including those obtained from primary care were reviewed and summarized in detail.    Oncology/Hematology History Overview Note   1.  Lupus anticoagulant  2.  COVID-19 spring 2021 with chronic respiratory difficulties on albuterol now seeing Dr. 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  3.  ADHD with anxiety and neurotic depression  4.  Gestational diabetes  5.  Obesity  6.  Dizziness  7.  Type 2 diabetes managed by Dr. Yoo  8.  5 miscarriages for which she had never had a work-up currently on birth control pill not wanting to get pregnant but referral made because of positive lupus anticoagulant    Hematology history timeline:  -4/13/2022 Lupus anticoagulant positive with dilute viper venom time 49.1 but the rest of the panel negative.  CBC and CMP unremarkable save for glucose 270.    -6/7/2022 [facility] consult: Dilute viper venom time abnormal suggestive of possible lupus anticoagulant.  For this to be considered a truly positive test it needs to be repeatable and to be considered antiphospholipid antibody syndrome she needs to meet the [place] criteria of which recurrent fetal wastage in the first trimester could be 1 component but it would also be more likely if she had a an abnormal beta-2 glycoprotein 1 and/or anticardiolipin antibody.  I will check this panel and see her back.  In addition, given that she is not practicing birth control and is 12 days late on her period,  we will check a pregnancy test.  She is going to practice barrier method.  She is due to see Dr. Pillai in July and he has pulmonary function tests ordered.  Chest x-ray was unremarkable in April.  She is oxygenating well in the office and does not desaturate with exercise and hence I will not push the issue with a CT PE  protocol at this junction though he may ultimately perform that pending his work-up.  I will check a D-dimer and if that is positive then I might proceed on with CT PE protocol for completeness sake even before she sees Dr. Pillai.     History of 5 spontaneous abortions       HISTORY OF PRESENT ILLNESS:  The patient is a 27 y.o.  female, referred for lupus anticoagulant positive tested in the face of 5 spontaneous first trimester abortions.  Not on birth control pill.  Chronic respiratory difficulties since COVID about a year ago now seeing pulmonary medicine with pulmonary functions pending.  No rashes or renal dysfunction that she is aware of.  No synovitis or arthritis.  No lower extremity swelling or history of blood clotting in her personal history but with a maternal grandfather who had recurrent VTE and  of PE postoperatively at an elderly age.  She is 12 days late for her period.    REVIEW OF SYSTEMS:  Other than mild chronic dyspnea since COVID, no new complaints.    Past Medical History:   Diagnosis Date   • ADHD    • Anxiety    • Diabetes, gestational    • Insomnia    • Neurotic depression    • Obesity      Past Surgical History:   Procedure Laterality Date   • TONSILLECTOMY         Current Outpatient Medications on File Prior to Visit   Medication Sig Dispense Refill   • albuterol sulfate  (90 Base) MCG/ACT inhaler Inhale 2 puffs Every 4 (Four) Hours As Needed for Wheezing. 4 g 2   • buPROPion XL (Wellbutrin XL) 150 MG 24 hr tablet Take 1 tablet by mouth Daily. 30 tablet 0   • melatonin 5 MG tablet tablet Take 10 mg by mouth.     • metFORMIN (GLUCOPHAGE) 1000 MG tablet Take 1 tablet by mouth 2 (Two) Times a Day With Meals. 60 tablet 5   • Semaglutide (Rybelsus) 3 MG tablet Take  by mouth.       No current facility-administered medications on file prior to visit.       No Known Allergies    Social History     Socioeconomic History   • Marital status:    Tobacco Use   • Smoking status:  "Never Smoker   • Smokeless tobacco: Never Used   Substance and Sexual Activity   • Alcohol use: Yes     Comment: 1 DRINK PER WEEK   • Drug use: Never   • Sexual activity: Yes     Partners: Male       Family History   Problem Relation Age of Onset   • Diabetes Mother    • Obesity Mother    • Depression Mother    • Clotting disorder Mother    • ADD / ADHD Father    • Depression Father    • Mental illness Father    • Alcohol abuse Father    • Allergies Sister    • Depression Sister    • Cancer Other    • Mental illness Other    • Diabetes Other    • Stroke Other    • Alzheimer's disease Other    • Asthma Other    • Depression Other    • Obesity Other        PHYSICAL EXAM:  No venous valvular insufficiency.  Perhaps a little pink around the cheeks but no clear-cut malar rash.  No tomy synovitis.  No Homans' sign or lower extremity swelling.    /78   Pulse 97   Temp 98.6 °F (37 °C)   Resp 18   Ht 167.6 cm (66\")   Wt (!) 143 kg (315 lb)   LMP  (LMP Unknown)   SpO2 98%   BMI 50.84 kg/m²     ECOG score: 0           ECOG: (0) Fully Active - Able to Carry On All Pre-disease Performance Without Restriction    Lab Results   Component Value Date    HGB 13.9 04/13/2022    HCT 41.9 04/13/2022    MCV 88 04/13/2022     04/13/2022    WBC 10.7 04/13/2022    NEUTROABS 7.8 (H) 04/13/2022    LYMPHSABS 2.3 04/13/2022    MONOSABS 0.4 04/13/2022    EOSABS 0.1 04/13/2022    BASOSABS 0.1 04/13/2022     Lab Results   Component Value Date    GLUCOSE 270 (H) 04/13/2022    BUN 9 04/13/2022    CREATININE 0.41 (L) 04/13/2022     04/13/2022    K 4.3 04/13/2022    CL 97 04/13/2022    CO2 20 04/13/2022    CALCIUM 9.4 04/13/2022    PROTEINTOT 7.8 07/30/2021    ALBUMIN 4.4 04/13/2022    BILITOT 0.8 04/13/2022    ALKPHOS 103 04/13/2022    AST 17 04/13/2022    ALT 21 04/13/2022         Assessment & Plan     1.  Lupus anticoagulant  2.  COVID-19 spring 2021 with chronic respiratory difficulties on albuterol now seeing Dr. 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  3.  ADHD with anxiety and neurotic depression  4.  Gestational diabetes  5.  Obesity  6.  Dizziness  7.  Type 2 diabetes managed by Dr. Yoo  8.  5 miscarriages for which she had never had a work-up currently on birth control pill not wanting to get pregnant but referral made because of positive lupus anticoagulant    Hematology history timeline:  -4/13/2022 Lupus anticoagulant positive with dilute viper venom time 49.1 but the rest of the panel negative.  CBC and CMP unremarkable save for glucose 270.    -6/7/2022 [facility] consult: Dilute viper venom time abnormal suggestive of possible lupus anticoagulant.  For this to be considered a truly positive test it needs to be repeatable and to be considered antiphospholipid antibody syndrome she needs to meet the [place] criteria of which recurrent fetal wastage in the first trimester could be 1 component but it would also be more likely if she had a an abnormal beta-2 glycoprotein 1 and/or anticardiolipin antibody.  I will check this panel and see her back.  In addition, given that she is not practicing birth control and is 12 days late on her period,  we will check a pregnancy test.  She is going to practice barrier method.  She is due to see Dr. Pillai in July and he has pulmonary function tests ordered.  Chest x-ray was unremarkable in April.  She is oxygenating well in the office and does not desaturate with exercise and hence I will not push the issue with a CT PE protocol at this junction though he may ultimately perform that pending his work-up.  I will check a D-dimer and if that is positive then I might proceed on with CT PE protocol for completeness sake even before she sees Dr. Pillai.    Total time of care today inclusive of time spent today prior to patient's arrival reviewing past medical records and history as catalogued above  and during visit covering that information with her as well as obtaining additional personal and family history as outlined above and after visit ordering tests as suggested and arranging follow-up took 45 minutes of patient care time throughout the day today.  Addy Mueller MD    6/7/2022

## 2022-06-14 ENCOUNTER — OFFICE VISIT (OUTPATIENT)
Dept: ONCOLOGY | Facility: CLINIC | Age: 27
End: 2022-06-14

## 2022-06-14 VITALS
WEIGHT: 293 LBS | SYSTOLIC BLOOD PRESSURE: 127 MMHG | OXYGEN SATURATION: 98 % | RESPIRATION RATE: 18 BRPM | TEMPERATURE: 97.8 F | DIASTOLIC BLOOD PRESSURE: 75 MMHG | BODY MASS INDEX: 47.09 KG/M2 | HEART RATE: 90 BPM | HEIGHT: 66 IN

## 2022-06-14 DIAGNOSIS — R76.0 LUPUS ANTICOAGULANT POSITIVE: Primary | ICD-10-CM

## 2022-06-14 PROBLEM — Z87.59: Chronic | Status: ACTIVE | Noted: 2022-06-07

## 2022-06-14 PROCEDURE — 99215 OFFICE O/P EST HI 40 MIN: CPT | Performed by: INTERNAL MEDICINE

## 2022-06-14 NOTE — PROGRESS NOTES
CHIEF COMPLAINT: Recurrent fetal wastage with lupus anticoagulant positive    Problem List:  Oncology/Hematology History Overview Note   1.  Lupus anticoagulant plus 5 first trimester miscarriages for which she had never had a work-up.    2.  COVID-19 spring 2021 with chronic respiratory difficulties on albuterol now seeing Dr. 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  3.  ADHD with anxiety and neurotic depression  4.  Gestational diabetes  5.  Obesity  6.  Dizziness  7.  Type 2 diabetes managed by Dr. Yoo      Hematology history timeline:  -4/13/2022 Lupus anticoagulant positive with dilute viper venom time 49.1 but the rest of the panel negative.  CBC and CMP unremarkable save for glucose 270.    -6/7/2022 [facility] consult: Dilute viper venom time abnormal suggestive of possible lupus anticoagulant.  For this to be considered a truly positive test it needs to be repeatable and to be considered antiphospholipid antibody syndrome she needs to meet the [place] criteria of which recurrent fetal wastage in the first trimester could be 1 component but it would also be more likely if she had a an abnormal beta-2 glycoprotein 1 and/or anticardiolipin antibody.  I will check this panel and see her back.  In addition, given that she is not practicing birth control and is 12 days late on her period,  we will check a pregnancy test.  She is going to practice barrier method.  She is due to see Dr. Pillai in July and he has pulmonary function tests ordered.  Chest x-ray was unremarkable in April.  She is oxygenating well in the office and does not desaturate with exercise and hence I will not push the issue with a CT PE protocol at this junction though he may ultimately perform that pending his work-up.  I will check a D-dimer and if that is positive then I might proceed on with CT PE protocol for completeness sake even before she  sees Dr. Pillai.    -6/7/2022 data:  Baseline PTT normal 30.2 but with dilute Camron viper venom time modestly elevated at 48.8 and DRV VT and DRVVM positive at 42.3 upper limit of 40.4 with ratio 1.3 upper limit of 1.2 borderline positive for presence of lupus anticoagulant.    Anticardiolipin antibody negative.  Beta-2 glycoprotein 1 negative.  D-dimer normal 0.3.  Urine pregnancy test negative.      -6/14/2022 [facility] follow-up: Her dilute Camron viper venom time ratios have been modestly abnormal twice but only 8 weeks apart and technically this needs to be >/= 12 or more weeks apart and her first test was done mid April and the second 1 done mid June 2022..  Nonetheless, with 5 unexplained first trimester miscarriages, it would not be a major harm to consider primary prophylaxis with aspirin until this all clears up.  This would not hinder a repeat lupus anticoagulant test the end of July to early August which would clearly be persistently positive if present at that point.  She does not have anticardiolipin antibody or beta-2 glycoprotein 1 but a positive lupus anticoagulant in the presence of recurrent fetal wastage in the first trimester otherwise unexplained without direct evidence of placental insufficiency which would be hard to document in the absence of delivery or invasive obstetric examination or presence of uterine malformations leading to miscarriages would fit [place] criteria for antiphospholipid antibody syndrome which, in a patient who has never had symptoms save for recurrent fetal wastage, would be managed either either with observation per some experts or perhaps get primary prophylaxis with aspirin.  I tend to be in favor of the enteric-coated baby aspirin which is relatively safe and not a bad idea in a patient who is obese with other risk factors.  I see no indications for dyspnea and her D-dimer was normal.  She follows up with Dr. Pillai who think she has post-COVID  wheezing.  Her urine pregnancy test was negative but she is still 20 days late so I will check a serum pregnancy test.  In addition to repeating the lupus anticoagulant in about 6 weeks, with her malar rash I will check her ILAN and extractable nuclear antigens along with sedimentation rate and C-reactive protein now and go over that along with her serum pregnancy test.  I have asked her to practice safe sex by way of barrier method to avoid risk of pro coagulation from birth control pills but also, especially if she is considering pregnancy, would have her consult with high risk maternal-fetal medicine for management and if she becomes pregnant (or even his purposing  to become pregnant ), would want her placed on Lovenox through her pregnancy and until she is done breast-feeding.  In the absence of tomy VTE, I would not keep her anticoagulated postpartum/post breast-feeding.  Though her D-dimer is negative, in light of her negative pregnancy test with normal renal function in April, if her serum pregnancy test is also negative, I will go ahead with a CT PE protocol for completeness sake given her ongoing dyspnea otherwise unexplained despite adequate oxygenation.  She sees Dr. Pillai back mid July.       History of 5 spontaneous abortions       HISTORY OF PRESENT ILLNESS:  The patient is a 27 y.o. female, here for follow up on management of recurrent fetal wastage first trimester with positive lupus anticoagulant.    Past Medical History:   Diagnosis Date   • ADHD    • Anxiety    • Diabetes, gestational    • Insomnia    • Neurotic depression    • Obesity      Past Surgical History:   Procedure Laterality Date   • TONSILLECTOMY         No Known Allergies    Family History and Social History reviewed and changed as necessary    REVIEW OF SYSTEM:   No new somatic complaints    PHYSICAL EXAM:  Malar rash potentially without synovitis.    Vitals:    06/14/22 0711   BP: 127/75   Pulse: 90   Resp: 18   Temp: 97.8 °F  "(36.6 °C)   SpO2: 98%   Weight: (!) 142 kg (313 lb)   Height: 167.6 cm (66\")     Vitals:    06/14/22 0711   PainSc: 0-No pain          ECOG score: 0           Vitals reviewed.    ECOG: (0) Fully Active - Able to Carry On All Pre-disease Performance Without Restriction    Lab Results   Component Value Date    HGB 13.9 04/13/2022    HCT 41.9 04/13/2022    MCV 88 04/13/2022     04/13/2022    WBC 10.7 04/13/2022    NEUTROABS 7.8 (H) 04/13/2022    LYMPHSABS 2.3 04/13/2022    MONOSABS 0.4 04/13/2022    EOSABS 0.1 04/13/2022    BASOSABS 0.1 04/13/2022       Lab Results   Component Value Date    GLUCOSE 270 (H) 04/13/2022    BUN 9 04/13/2022    CREATININE 0.41 (L) 04/13/2022     04/13/2022    K 4.3 04/13/2022    CL 97 04/13/2022    CO2 20 04/13/2022    CALCIUM 9.4 04/13/2022    PROTEINTOT 7.8 07/30/2021    ALBUMIN 4.4 04/13/2022    BILITOT 0.8 04/13/2022    ALKPHOS 103 04/13/2022    AST 17 04/13/2022    ALT 21 04/13/2022             ASSESSMENT & PLAN:  1.  Lupus anticoagulant plus 5 first trimester miscarriages for which she had never had a work-up.    2.  COVID-19 spring 2021 with chronic respiratory difficulties on albuterol now seeing Dr. 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  3.  ADHD with anxiety and neurotic depression  4.  Gestational diabetes  5.  Obesity  6.  Dizziness  7.  Type 2 diabetes managed by Dr. Yoo  8.      Hematology history timeline:  -4/13/2022 Lupus anticoagulant positive with dilute viper venom time 49.1 but the rest of the panel negative.  CBC and CMP unremarkable save for glucose 270.    -6/7/2022 [facility] consult: Dilute viper venom time abnormal suggestive of possible lupus anticoagulant.  For this to be considered a truly positive test it needs to be repeatable and to be considered antiphospholipid antibody syndrome she needs to meet the [place] criteria of which recurrent fetal wastage " in the first trimester could be 1 component but it would also be more likely if she had a an abnormal beta-2 glycoprotein 1 and/or anticardiolipin antibody.  I will check this panel and see her back.  In addition, given that she is not practicing birth control and is 12 days late on her period,  we will check a pregnancy test.  She is going to practice barrier method.  She is due to see Dr. Pillai in July and he has pulmonary function tests ordered.  Chest x-ray was unremarkable in April.  She is oxygenating well in the office and does not desaturate with exercise and hence I will not push the issue with a CT PE protocol at this junction though he may ultimately perform that pending his work-up.  I will check a D-dimer and if that is positive then I might proceed on with CT PE protocol for completeness sake even before she sees Dr. Pillai.    -6/7/2022 data:  Baseline PTT normal 30.2 but with dilute Camron viper venom time modestly elevated at 48.8 and DRV VT and DRVVM positive at 42.3 upper limit of 40.4 with ratio 1.3 upper limit of 1.2 borderline positive for presence of lupus anticoagulant.    Anticardiolipin antibody negative.  Beta-2 glycoprotein 1 negative.  D-dimer normal 0.3.  Urine pregnancy test negative.    -6/14/2022 [facility] follow-up: Her dilute Camron viper venom time ratios have been modestly abnormal twice but only 8 weeks apart and technically this needs to be >/= 12 or more weeks apart and her first test was done mid April and the second 1 done mid June 2022..  Nonetheless, with 5 unexplained first trimester miscarriages, it would not be a major harm to consider primary prophylaxis with aspirin until this all clears up.  This would not hinder a repeat lupus anticoagulant test the end of July to early August which would clearly be persistently positive if present at that point.  She does not have anticardiolipin antibody or beta-2 glycoprotein 1 but a positive lupus anticoagulant in  the presence of recurrent fetal wastage in the first trimester otherwise unexplained without direct evidence of placental insufficiency which would be hard to document in the absence of delivery or invasive obstetric examination or presence of uterine malformations leading to miscarriages would fit [place] criteria for antiphospholipid antibody syndrome which, in a patient who has never had symptoms save for recurrent fetal wastage, would be managed either either with observation per some experts or perhaps get primary prophylaxis with aspirin.  I tend to be in favor of the enteric-coated baby aspirin which is relatively safe and not a bad idea in a patient who is obese with other risk factors.  I see no indications for dyspnea and her D-dimer was normal.  She follows up with Dr. Pillai who think she has post-COVID wheezing.  Her urine pregnancy test was negative but she is still 20 days late so I will check a serum pregnancy test.  In addition to repeating the lupus anticoagulant in about 6 weeks, with her malar rash I will check her ILAN and extractable nuclear antigens along with sedimentation rate and C-reactive protein now and go over that along with her serum pregnancy test.  I have asked her to practice safe sex by way of barrier method to avoid risk of pro coagulation from birth control pills but also, especially if she is considering pregnancy, would have her consult with high risk maternal-fetal medicine for management and if she becomes pregnant (or even his purposing  to become pregnant ), would want her placed on Lovenox through her pregnancy and until she is done breast-feeding.  In the absence of tomy VTE, I would not keep her anticoagulated postpartum/post breast-feeding.  Though her D-dimer is negative, in light of her negative pregnancy test with normal renal function in April, if her serum pregnancy test is also negative, I will go ahead with a CT PE protocol for completeness sake given her  ongoing dyspnea otherwise unexplained despite adequate oxygenation.  She sees Dr. Pillai back mid July.    Total time of care today inclusive of time spent today prior to her arrival reviewing interval data from 6/7/2022 and during visit translating that information to her and going over the [place] criteria and management of possible antiphospholipid antibody syndrome and lupus work-up and reasons there to and after visit arranging for interval labs as outlined and follow-up took 40 minutes of patient care time throughout the day today.  Addy Mueller MD    06/14/2022

## 2022-06-21 ENCOUNTER — OFFICE VISIT (OUTPATIENT)
Dept: ONCOLOGY | Facility: CLINIC | Age: 27
End: 2022-06-21

## 2022-06-21 VITALS
HEART RATE: 93 BPM | DIASTOLIC BLOOD PRESSURE: 74 MMHG | HEIGHT: 66 IN | RESPIRATION RATE: 18 BRPM | WEIGHT: 293 LBS | BODY MASS INDEX: 47.09 KG/M2 | TEMPERATURE: 97.5 F | SYSTOLIC BLOOD PRESSURE: 128 MMHG

## 2022-06-21 DIAGNOSIS — R76.0 LUPUS ANTICOAGULANT POSITIVE: Chronic | ICD-10-CM

## 2022-06-21 DIAGNOSIS — Z87.59: Primary | Chronic | ICD-10-CM

## 2022-06-21 PROCEDURE — 99215 OFFICE O/P EST HI 40 MIN: CPT | Performed by: INTERNAL MEDICINE

## 2022-06-21 NOTE — PROGRESS NOTES
CHIEF COMPLAINT: Syncopal episode    Problem List:  Oncology/Hematology History Overview Note   1.  Lupus anticoagulant plus 5 first trimester miscarriages for which she had never had a work-up.    2.  COVID-19 spring 2021 with chronic respiratory difficulties on albuterol now seeing Dr. 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  3.  ADHD with anxiety and neurotic depression  4.  Gestational diabetes  5.  Obesity  6.  Dizziness  7.  Type 2 diabetes managed by Dr. Yoo      Hematology history timeline:  -4/13/2022 Lupus anticoagulant positive with dilute viper venom time 49.1 but the rest of the panel negative.  CBC and CMP unremarkable save for glucose 270.    -6/7/2022 [facility] consult: Dilute viper venom time abnormal suggestive of possible lupus anticoagulant.  For this to be considered a truly positive test it needs to be repeatable and to be considered antiphospholipid antibody syndrome she needs to meet the [place] criteria of which recurrent fetal wastage in the first trimester could be 1 component but it would also be more likely if she had a an abnormal beta-2 glycoprotein 1 and/or anticardiolipin antibody.  I will check this panel and see her back.  In addition, given that she is not practicing birth control and is 12 days late on her period,  we will check a pregnancy test.  She is going to practice barrier method.  She is due to see Dr. Pillai in July and he has pulmonary function tests ordered.  Chest x-ray was unremarkable in April.  She is oxygenating well in the office and does not desaturate with exercise and hence I will not push the issue with a CT PE protocol at this junction though he may ultimately perform that pending his work-up.  I will check a D-dimer and if that is positive then I might proceed on with CT PE protocol for completeness sake even before she sees Dr. Pillai.    -6/7/2022  data:  Baseline PTT normal 30.2 but with dilute Camron viper venom time modestly elevated at 48.8 and DRV VT and DRVVM positive at 42.3 upper limit of 40.4 with ratio 1.3 upper limit of 1.2 borderline positive for presence of lupus anticoagulant.    Anticardiolipin antibody negative.  Beta-2 glycoprotein 1 negative.  D-dimer normal 0.3.  Urine pregnancy test negative.      -6/14/2022 [facility] follow-up: Her dilute Camron viper venom time ratios have been modestly abnormal twice but only 8 weeks apart and technically this needs to be >/= 12 or more weeks apart and her first test was done mid April and the second 1 done mid June 2022..  Nonetheless, with 5 unexplained first trimester miscarriages, it would not be a major harm to consider primary prophylaxis with aspirin until this all clears up.  This would not hinder a repeat lupus anticoagulant test the end of July to early August which would clearly be persistently positive if present at that point.  She does not have anticardiolipin antibody or beta-2 glycoprotein 1 but a positive lupus anticoagulant in the presence of recurrent fetal wastage in the first trimester otherwise unexplained without direct evidence of placental insufficiency which would be hard to document in the absence of delivery or invasive obstetric examination or presence of uterine malformations leading to miscarriages would fit S[place] criteria for antiphospholipid antibody syndrome which, in a patient who has never had symptoms save for recurrent fetal wastage, would be managed either either with observation per some experts or perhaps get primary prophylaxis with aspirin.  I tend to be in favor of the enteric-coated baby aspirin which is relatively safe and not a bad idea in a patient who is obese with other risk factors.  I see no indications for dyspnea and her D-dimer was normal.  She follows up with Dr. Pillai who think she has post-COVID wheezing.  Her urine pregnancy test  was negative but she is still 20 days late so I will check a serum pregnancy test.  In addition to repeating the lupus anticoagulant in about 6 weeks, with her malar rash I will check her ILAN and extractable nuclear antigens along with sedimentation rate and C-reactive protein now and go over that along with her serum pregnancy test.  I have asked her to practice safe sex by way of barrier method to avoid risk of pro coagulation from birth control pills but also, especially if she is considering pregnancy, would have her consult with high risk maternal-fetal medicine for management and if she becomes pregnant (or even his purposing  to become pregnant ), would want her placed on Lovenox through her pregnancy and until she is done breast-feeding.  In the absence of tomy VTE, I would not keep her anticoagulated postpartum/post breast-feeding.  Though her D-dimer is negative, in light of her negative pregnancy test with normal renal function in April, if her serum pregnancy test is also negative, I will go ahead with a CT PE protocol for completeness sake given her ongoing dyspnea otherwise unexplained despite adequate oxygenation.  She sees Dr. Pillai back mid July.    -6/14/2022 ILAN not performed by [facility] as ordered.  Extractable nuclear antigens negative.  Sedimentation rate normal 20.  C-reactive protein normal 1.2.  Quantitative hCG less than 2.  Not pregnant.  Qualitative hCG also negative.    -6/21/2022 [facility] follow-up: I reviewed the above labs with her.  A few days back in the heat she developed what she thinks was a panic attack and a syncopal episode from hyperventilation but she still saying it is hard to catch her breath and having bilateral shoulder pain.  I have called the emergency room at [facility] to update them on all of the above data since I have seen her and I will go ahead and send her to the emergency room now for completeness sake to be sure that she is not  "having PE.  I asked him to do CT PE protocol and Dopplers of the legs and D-dimer and she sees Dr[facility] in July.  Assuming no evidence of venous thromboembolic disease in the chest or legs, I would not anticoagulate her but would just continue with the prophylactic baby aspirin and avoid pregnancy.  She has started a little bit of spotting but not full-blown.  Yet but both serum and urine testing for pregnancy has been negative.  I will repeat her antiphospholipid antibody panel prior to return to me early August.     History of 5 spontaneous abortions       HISTORY OF PRESENT ILLNESS:  The patient is a 27 y.o. female, here for follow up on management of positive lupus anticoagulant.  A few days back she had a syncopal episode after which she thinks is a panic attack in the heat and passed out behind the wheel of a car.  She did not seek medical attention but her  took care of her who was a medic.  Still having bilateral shoulder pains and dyspnea on exertion.    Past Medical History:   Diagnosis Date   • ADHD    • Anxiety    • Diabetes, gestational    • Insomnia    • Neurotic depression    • Obesity      Past Surgical History:   Procedure Laterality Date   • TONSILLECTOMY         No Known Allergies    Family History and Social History reviewed and changed as necessary    REVIEW OF SYSTEM:   Bilateral shoulder pains and dyspnea on exertion    PHYSICAL EXAM:  No audible wheezes.  Mild malar rash    Vitals:    06/21/22 0823   BP: 128/74   Pulse: 93   Resp: 18   Temp: 97.5 °F (36.4 °C)   Weight: (!) 143 kg (316 lb)   Height: 167.6 cm (66\")     Vitals:    06/21/22 0823   PainSc:   4   PainLoc: Abdomen          ECOG score: 0           Vitals reviewed.    ECOG: (0) Fully Active - Able to Carry On All Pre-disease Performance Without Restriction    Lab Results   Component Value Date    HGB 13.9 04/13/2022    HCT 41.9 04/13/2022    MCV 88 04/13/2022     04/13/2022    WBC 10.7 " 04/13/2022    NEUTROABS 7.8 (H) 04/13/2022    LYMPHSABS 2.3 04/13/2022    MONOSABS 0.4 04/13/2022    EOSABS 0.1 04/13/2022    BASOSABS 0.1 04/13/2022       Lab Results   Component Value Date    GLUCOSE 270 (H) 04/13/2022    BUN 9 04/13/2022    CREATININE 0.41 (L) 04/13/2022     04/13/2022    K 4.3 04/13/2022    CL 97 04/13/2022    CO2 20 04/13/2022    CALCIUM 9.4 04/13/2022    PROTEINTOT 7.8 07/30/2021    ALBUMIN 4.4 04/13/2022    BILITOT 0.8 04/13/2022    ALKPHOS 103 04/13/2022    AST 17 04/13/2022    ALT 21 04/13/2022             ASSESSMENT & PLAN:  1.  Lupus anticoagulant plus 5 first trimester miscarriages for which she had never had a work-up.    2.  COVID-19 spring 2021 with chronic respiratory difficulties on albuterol now seeing Dr. 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  3.  ADHD with anxiety and neurotic depression  4.  Gestational diabetes  5.  Obesity  6.  Dizziness  7.  Type 2 diabetes managed by Dr. Yoo      Hematology history timeline:  -4/13/2022 Lupus anticoagulant positive with dilute viper venom time 49.1 but the rest of the panel negative.  CBC and CMP unremarkable save for glucose 270.    -6/7/2022 [facility] consult: Dilute viper venom time abnormal suggestive of possible lupus anticoagulant.  For this to be considered a truly positive test it needs to be repeatable and to be considered antiphospholipid antibody syndrome she needs to meet the [place] criteria of which recurrent fetal wastage in the first trimester could be 1 component but it would also be more likely if she had a an abnormal beta-2 glycoprotein 1 and/or anticardiolipin antibody.  I will check this panel and see her back.  In addition, given that she is not practicing birth control and is 12 days late on her period,  we will check a pregnancy test.  She is going to practice barrier method.  She is due to see Dr. Pillai in July  and he has pulmonary function tests ordered.  Chest x-ray was unremarkable in April.  She is oxygenating well in the office and does not desaturate with exercise and hence I will not push the issue with a CT PE protocol at this junction though he may ultimately perform that pending his work-up.  I will check a D-dimer and if that is positive then I might proceed on with CT PE protocol for completeness sake even before she sees Dr. Pillai.    -6/7/2022 data:  Baseline PTT normal 30.2 but with dilute Camron viper venom time modestly elevated at 48.8 and DRVVT and DRVVM positive at 42.3 upper limit of 40.4 with ratio 1.3 upper limit of 1.2 borderline positive for presence of lupus anticoagulant.    Anticardiolipin antibody negative.  Beta-2 glycoprotein 1 negative.  D-dimer normal 0.3.  Urine pregnancy test negative.      -6/14/2022 [facility] follow-up: Her dilute Camron viper venom time ratios have been modestly abnormal twice but only 8 weeks apart and technically this needs to be >/= 12 or more weeks apart and her first test was done mid April and the second 1 done mid June 2022..  Nonetheless, with 5 unexplained first trimester miscarriages, it would not be a major harm to consider primary prophylaxis with aspirin until this all clears up.  This would not hinder a repeat lupus anticoagulant test the end of July to early August which would clearly be persistently positive if present at that point.  She does not have anticardiolipin antibody or beta-2 glycoprotein 1 but a positive lupus anticoagulant in the presence of recurrent fetal wastage in the first trimester otherwise unexplained without direct evidence of placental insufficiency which would be hard to document in the absence of delivery or invasive obstetric examination or presence of uterine malformations leading to miscarriages would fit [place] criteria for antiphospholipid antibody syndrome which, in a patient who has never had symptoms save  for recurrent fetal wastage, would be managed either either with observation per some experts or perhaps get primary prophylaxis with aspirin.  I tend to be in favor of the enteric-coated baby aspirin which is relatively safe and not a bad idea in a patient who is obese with other risk factors.  I see no indications for dyspnea and her D-dimer was normal.  She follows up with Dr. Pillai who think she has post-COVID wheezing.  Her urine pregnancy test was negative but she is still 20 days late so I will check a serum pregnancy test.  In addition to repeating the lupus anticoagulant in about 6 weeks, with her malar rash I will check her ILAN and extractable nuclear antigens along with sedimentation rate and C-reactive protein now and go over that along with her serum pregnancy test.  I have asked her to practice safe sex by way of barrier method to avoid risk of pro coagulation from birth control pills but also, especially if she is considering pregnancy, would have her consult with high risk maternal-fetal medicine for management and if she becomes pregnant (or even his purposing  to become pregnant ), would want her placed on Lovenox through her pregnancy and until she is done breast-feeding.  In the absence of tomy VTE, I would not keep her anticoagulated postpartum/post breast-feeding.  Though her D-dimer is negative, in light of her negative pregnancy test with normal renal function in April, if her serum pregnancy test is also negative, I will go ahead with a CT PE protocol for completeness sake given her ongoing dyspnea otherwise unexplained despite adequate oxygenation.  She sees Dr. Pillai back mid July.    -6/14/2022 ILAN not performed by [facility] as ordered.  Extractable nuclear antigens negative.  Sedimentation rate normal 20.  C-reactive protein normal 1.2.  Quantitative hCG less than 2.  Not pregnant.  Qualitative hCG also negative.    -6/21/2022 [facility]ology follow-up: I reviewed the  above labs with her.  A few days back in the heat she developed what she thinks was a panic attack and a syncopal episode from hyperventilation but she still saying it is hard to catch her breath and having bilateral shoulder pain.  I have called the emergency room at [facility] to update them on all of the above data since I have seen her and I will go ahead and send her to the emergency room now for completeness sake to be sure that she is not having PE.  I asked him to do CT PE protocol and Dopplers of the legs and D-dimer and she sees Dr[facility] with [facility] in July.  Assuming no evidence of venous thromboembolic disease in the chest or legs, I would not anticoagulate her but would just continue with the prophylactic baby aspirin and avoid pregnancy.  She has started a little bit of spotting but not full-blown.  Yet but both serum and urine testing for pregnancy has been negative.  I will repeat her antiphospholipid antibody panel prior to return to me early August.    Total time of care today inclusive of time spent today prior to her arrival reviewing interval data and during visit interviewing her as to ongoing signs and symptoms as outlined above and putting forth the plan as outlined and calling the emergency room and communicating with them and after visit instituting the plan as outlined above took 43 minutes of patient care time today.    -  Addy Mueller MD    06/21/2022

## 2022-06-27 ENCOUNTER — OFFICE VISIT (OUTPATIENT)
Dept: FAMILY MEDICINE CLINIC | Facility: CLINIC | Age: 27
End: 2022-06-27

## 2022-06-27 VITALS
SYSTOLIC BLOOD PRESSURE: 122 MMHG | HEART RATE: 97 BPM | WEIGHT: 293 LBS | BODY MASS INDEX: 47.09 KG/M2 | DIASTOLIC BLOOD PRESSURE: 92 MMHG | OXYGEN SATURATION: 98 % | HEIGHT: 66 IN

## 2022-06-27 DIAGNOSIS — E11.9 TYPE 2 DIABETES MELLITUS WITHOUT COMPLICATION, WITHOUT LONG-TERM CURRENT USE OF INSULIN: Primary | ICD-10-CM

## 2022-06-27 DIAGNOSIS — Z86.2 HISTORY OF LUPUS ANTICOAGULANT DISORDER: ICD-10-CM

## 2022-06-27 DIAGNOSIS — J45.40 MODERATE PERSISTENT ASTHMA WITHOUT COMPLICATION: ICD-10-CM

## 2022-06-27 DIAGNOSIS — F41.9 ANXIETY: ICD-10-CM

## 2022-06-27 DIAGNOSIS — F33.1 MODERATE EPISODE OF RECURRENT MAJOR DEPRESSIVE DISORDER: ICD-10-CM

## 2022-06-27 PROCEDURE — 99214 OFFICE O/P EST MOD 30 MIN: CPT | Performed by: FAMILY MEDICINE

## 2022-06-27 NOTE — PROGRESS NOTES
Answers for HPI/ROS submitted by the patient on 6/26/2022  What is the primary reason for your visit?: Diabetes    Chief Complaint  Med Refill, Depression, and Diabetes    Subjective          Priti Osman presents to [facility]  Patient is a 27-year-old female who presents for follow-up at her last appointment her A1c was up to 10.3.  We had determined that it was type 2 diabetes she still has an appoint with endocrinology scheduled next month.  She has been doing well on the Rybelsus along with the metformin her blood glucoses come down to around 1 50-1 80.    She has also seen Dr. Mueller with hematology for her positive lupus anticoagulant and she has tested +2 different times a she is at this point told to be on aspirin daily if and when she decides to get pregnant they will transition her over to Lovenox injections.  She is using contraception at this time.    She is struggled with weight loss she would like to get referred over to bariatrics to discuss bariatric surgery.    Diabetes  She has type 2 diabetes mellitus. No MedicAlert identification noted. The initial diagnosis of diabetes was made 4 years ago. Pertinent negatives for hypoglycemia include no confusion, dizziness, headaches, hunger, mood changes, nervousness/anxiousness, pallor, seizures, sleepiness, speech difficulty, sweats or tremors. Pertinent negatives for diabetes include no blurred vision, no chest pain, no fatigue, no foot paresthesias, no foot ulcerations, no polydipsia, no polyphagia, no polyuria, no visual change, no weakness and no weight loss. Pertinent negatives for hypoglycemia complications include no blackouts, no hospitalization, no nocturnal hypoglycemia, no required assistance and no required glucagon injection. Symptoms are stable. Pertinent negatives for diabetic complications include no CVA, heart disease, impotence, nephropathy, peripheral neuropathy, PVD or retinopathy. Risk factors for  "coronary artery disease include dyslipidemia, family history, obesity and stress. Current diabetic treatment includes oral agent (dual therapy). She is compliant with treatment all of the time. She has not had a previous visit with a dietitian. She does not see a podiatrist.Eye exam is current.       Objective   Vital Signs:   /92   Pulse 97   Ht 167.6 cm (66\")   Wt (!) 143 kg (315 lb 3.2 oz)   SpO2 98%   BMI 50.87 kg/m²     Body mass index is 50.87 kg/m².    Review of Systems   Constitutional: Negative.  Negative for fatigue and unexpected weight loss.   HENT: Negative.    Eyes: Negative.  Negative for blurred vision.   Respiratory: Negative.    Cardiovascular: Negative.  Negative for chest pain.   Gastrointestinal: Negative.    Endocrine: Negative.  Negative for polydipsia, polyphagia and polyuria.   Genitourinary: Negative.  Negative for impotence.   Musculoskeletal: Negative.    Skin: Negative.  Negative for pallor.   Allergic/Immunologic: Negative.    Neurological: Negative for dizziness, tremors, seizures, speech difficulty, weakness and confusion.   Hematological: Negative.    Psychiatric/Behavioral: The patient is not nervous/anxious.        Past History:  Medical History: has a past medical history of ADHD, Anxiety, Diabetes, gestational, Insomnia, Neurotic depression, and Obesity.   Surgical History: has a past surgical history that includes Tonsillectomy.   Family History: family history includes ADD / ADHD in her father; Alcohol abuse in her father; Allergies in her sister; Alzheimer's disease in an other family member; Asthma in an other family member; Cancer in an other family member; Clotting disorder in her mother; Depression in her father, mother, sister, and another family member; Diabetes in her mother and another family member; Mental illness in her father and another family member; Obesity in her mother and another family member; Stroke in an other family member.   Social History: " reports that she has never smoked. She has never used smokeless tobacco. She reports current alcohol use. She reports that she does not use drugs.      Current Outpatient Medications:   •  albuterol sulfate  (90 Base) MCG/ACT inhaler, Inhale 2 puffs Every 4 (Four) Hours As Needed for Wheezing., Disp: 4 g, Rfl: 2  •  buPROPion XL (Wellbutrin XL) 150 MG 24 hr tablet, Take 1 tablet by mouth Daily., Disp: 30 tablet, Rfl: 0  •  melatonin 5 MG tablet tablet, Take 10 mg by mouth., Disp: , Rfl:   •  metFORMIN (GLUCOPHAGE) 1000 MG tablet, Take 1 tablet by mouth 2 (Two) Times a Day With Meals., Disp: 60 tablet, Rfl: 5  •  Semaglutide (Rybelsus) 3 MG tablet, Take  by mouth., Disp: , Rfl:     Allergies: Adhesive tape    Physical Exam  Constitutional:       Appearance: Normal appearance. She is normal weight.   HENT:      Head: Normocephalic and atraumatic.   Eyes:      Conjunctiva/sclera: Conjunctivae normal.   Cardiovascular:      Rate and Rhythm: Normal rate and regular rhythm.   Pulmonary:      Effort: Pulmonary effort is normal.      Breath sounds: Normal breath sounds.   Musculoskeletal:         General: Normal range of motion.      Cervical back: Normal range of motion and neck supple.   Skin:     General: Skin is warm and dry.   Neurological:      General: No focal deficit present.      Mental Status: She is alert and oriented to person, place, and time. Mental status is at baseline.   Psychiatric:         Mood and Affect: Mood normal.         Behavior: Behavior normal.         Thought Content: Thought content normal.         Judgment: Judgment normal.          Result Review :                   Assessment and Plan    Diagnoses and all orders for this visit:    1. Type 2 diabetes mellitus without complication, without long-term current use of insulin ([facility]) (Primary)  -     T4, Free; Future  -     TSH; Future  -     Hemoglobin A1c; Future  We will get blood work and follow-up continue the Rybelsus plus the metformin  keep a good blood glucose log  She has an appointment with endocrinology we will follow-up    2. Moderate episode of recurrent major depressive disorder (HCC)  Stable continue meds and monitoring    3. Anxiety  Stable continue meds and monitoring    4. History of lupus anticoagulant disorder  Stable continue to follow with hematology    5. Moderate persistent asthma without complication  Stable has an appointment with pulmonology coming up    6. BMI 50.0-59.9, adult (HCC)  Per patient's request we will refer her to bariatrics in [place]      PATIENT HAS BEEN ADVISED WHILE ON NEW MEDICATION PRESCRIBED IT IS IMPORTANT TO USE BACK UP CONTRACEPTION OR BACK UP BIRTH CONTROL AS THE USE OF THIS MEDICATION WITH PREGNANCY COULD CAUSE POTENTIAL RISKS IF PATIENT DOES BECOME PREGNANT.    MEDICATION SIDE EFFECTS AND RISKS HAVE BEEN DISCUSSED WITH PATIENT. PATIENT NOTES UNDERSTANDING. IF ANY CONCERN OR QUESTION REGARDING MEDICATION PLEASE CONTACT.         Follow Up   No follow-ups on file.  Patient was given instructions and counseling regarding her condition or for health maintenance advice. Please see specific information pulled into the AVS if appropriate.     Angle Yoo DO

## 2022-06-28 LAB
HBA1C MFR BLD: 10.2 % (ref 4.8–5.6)
T4 FREE SERPL-MCNC: 1.2 NG/DL (ref 0.82–1.77)
TSH SERPL DL<=0.005 MIU/L-ACNC: 1.63 UIU/ML (ref 0.45–4.5)

## 2022-07-12 ENCOUNTER — TELEPHONE (OUTPATIENT)
Dept: ONCOLOGY | Facility: CLINIC | Age: 27
End: 2022-07-12

## 2022-07-12 ENCOUNTER — OFFICE VISIT (OUTPATIENT)
Dept: ENDOCRINOLOGY | Facility: CLINIC | Age: 27
End: 2022-07-12

## 2022-07-12 ENCOUNTER — LAB (OUTPATIENT)
Dept: LAB | Facility: HOSPITAL | Age: 27
End: 2022-07-12

## 2022-07-12 VITALS
BODY MASS INDEX: 47.09 KG/M2 | HEIGHT: 66 IN | WEIGHT: 293 LBS | SYSTOLIC BLOOD PRESSURE: 126 MMHG | DIASTOLIC BLOOD PRESSURE: 74 MMHG | HEART RATE: 90 BPM | OXYGEN SATURATION: 98 %

## 2022-07-12 DIAGNOSIS — N92.6 MISSED PERIOD: ICD-10-CM

## 2022-07-12 DIAGNOSIS — E11.65 TYPE 2 DIABETES MELLITUS WITH HYPERGLYCEMIA, UNSPECIFIED WHETHER LONG TERM INSULIN USE: Primary | ICD-10-CM

## 2022-07-12 LAB
EXPIRATION DATE: ABNORMAL
GLUCOSE BLDC GLUCOMTR-MCNC: 434 MG/DL (ref 70–130)
Lab: ABNORMAL

## 2022-07-12 PROCEDURE — 99204 OFFICE O/P NEW MOD 45 MIN: CPT | Performed by: INTERNAL MEDICINE

## 2022-07-12 PROCEDURE — 84703 CHORIONIC GONADOTROPIN ASSAY: CPT | Performed by: INTERNAL MEDICINE

## 2022-07-12 PROCEDURE — 82947 ASSAY GLUCOSE BLOOD QUANT: CPT | Performed by: INTERNAL MEDICINE

## 2022-07-12 RX ORDER — ORAL SEMAGLUTIDE 7 MG/1
7 TABLET ORAL DAILY
Qty: 30 TABLET | Refills: 3 | Status: SHIPPED | OUTPATIENT
Start: 2022-07-12 | End: 2022-10-21 | Stop reason: SDUPTHER

## 2022-07-12 RX ORDER — INSULIN DETEMIR 100 [IU]/ML
INJECTION, SOLUTION SUBCUTANEOUS
Qty: 9 ML | Refills: 3 | Status: SHIPPED | OUTPATIENT
Start: 2022-07-12 | End: 2022-10-21 | Stop reason: SDUPTHER

## 2022-07-12 NOTE — TELEPHONE ENCOUNTER
Patient called she is having pregnancy symptoms and was told to call if this happened that she may need to be referred to a specialist? Please call.

## 2022-07-12 NOTE — PROGRESS NOTES
Chief Complaint   Patient presents with   • Diabetes        New patient who is being seen in consultation regarding diabetes at the request of Angle Yoo DO HPI   Priti Osman is a 27 y.o. female who presents for evaluation of diabetes.     Patient has a history of type 2 diabetes.  This was initially diagnosed in 4-5 years ago on routine labs.  Patient was initially started on metformin.  Patient is currently taking metformin 1000 mg twice daily and rybelsus 3 mg daily.  This regimen was last changed 3-4 months ago when rybelsus.  Patient reports most recent A1c was greater than 10. Patient reports good adherence to medications.      Patient previously took insulin which was discontinued when she lost insurance approximately 1 year ago.     Patient denies hypoglycemia or symptoms concerning for hypoglycemia  Patient monitors blood glucose intermittently.  Patient attempts to follow a diabetic diet    Patient denies foot related concerns such as numbness, tingling, or pain.  History of dyslipidemia: No  History of renal dysfunction: No  History of Hypertension: No    Of note, patient reports she has not had a menstrual cycle in more than 40 days.  She does report she is sexually active.  She has not taken a pregnancy test.  She does report a history of miscarriage.  Past Medical History:   Diagnosis Date   • ADHD    • Anxiety    • Insomnia    • Neurotic depression    • Obesity    • Type 2 diabetes mellitus (HCC)      Past Surgical History:   Procedure Laterality Date   • TONSILLECTOMY        Family History   Problem Relation Age of Onset   • Diabetes Mother    • Obesity Mother    • ADD / ADHD Father    • Depression Father    • Mental illness Father    • Alcohol abuse Father    • Drug abuse Father    • Alcohol abuse Sister    • Allergies Sister    • Depression Sister    • Cancer Other    • Mental illness Other    • Diabetes Other    • Stroke Other    • Alzheimer's disease Other    • Asthma Other    •  Depression Other    • Obesity Other       Social History     Socioeconomic History   • Marital status:    Tobacco Use   • Smoking status: Never Smoker   • Smokeless tobacco: Never Used   Vaping Use   • Vaping Use: Never used   Substance and Sexual Activity   • Alcohol use: Yes     Comment: 1 DRINK PER WEEK   • Drug use: Never   • Sexual activity: Yes     Partners: Male      Allergies   Allergen Reactions   • Adhesive Tape Hives      Current Outpatient Medications on File Prior to Visit   Medication Sig Dispense Refill   • buPROPion XL (Wellbutrin XL) 150 MG 24 hr tablet Take 1 tablet by mouth Daily. 30 tablet 0   • melatonin 5 MG tablet tablet Take 10 mg by mouth.     • metFORMIN (GLUCOPHAGE) 1000 MG tablet Take 1 tablet by mouth 2 (Two) Times a Day With Meals. 60 tablet 5   • [DISCONTINUED] Semaglutide (Rybelsus) 3 MG tablet Take  by mouth.     • [DISCONTINUED] albuterol sulfate  (90 Base) MCG/ACT inhaler Inhale 2 puffs Every 4 (Four) Hours As Needed for Wheezing. 4 g 2     No current facility-administered medications on file prior to visit.        Review of Systems   Constitutional: Positive for appetite change, fatigue and unexpected weight loss. Negative for unexpected weight gain.   HENT: Negative for trouble swallowing and voice change.    Respiratory: Positive for apnea, cough, chest tightness and shortness of breath.    Cardiovascular: Negative for palpitations and leg swelling.   Gastrointestinal: Negative for constipation and diarrhea.   Endocrine: Positive for heat intolerance. Negative for cold intolerance.   Genitourinary: Positive for dyspareunia and menstrual problem.   Musculoskeletal: Negative for arthralgias and myalgias.   Skin: Negative for dry skin and rash.   Allergic/Immunologic: Positive for environmental allergies.   Neurological: Positive for headache. Negative for tremors.   Psychiatric/Behavioral: Negative for sleep disturbance. The patient is not nervous/anxious.   "      Vitals:    07/12/22 1030   BP: 126/74   BP Location: Right arm   Patient Position: Sitting   Cuff Size: Adult   Pulse: 90   SpO2: 98%   Weight: (!) 143 kg (315 lb)   Height: 167.6 cm (66\")   Body mass index is 50.84 kg/m².     Physical Exam  Vitals reviewed.   Constitutional:       General: She is not in acute distress.     Appearance: She is obese.   HENT:      Head: Normocephalic and atraumatic.      Right Ear: Hearing normal.      Left Ear: Hearing normal.      Nose: Nose normal.   Eyes:      General: Lids are normal.      Conjunctiva/sclera: Conjunctivae normal.   Neck:      Thyroid: No thyromegaly or thyroid tenderness.   Cardiovascular:      Rate and Rhythm: Normal rate and regular rhythm.      Heart sounds: No murmur heard.  Pulmonary:      Effort: Pulmonary effort is normal.      Breath sounds: Normal breath sounds and air entry.   Abdominal:      General: Abdomen is protuberant.      Palpations: Abdomen is soft.      Tenderness: There is no abdominal tenderness.   Lymphadenopathy:      Cervical: No cervical adenopathy.   Neurological:      General: No focal deficit present.      Mental Status: She is alert.   Psychiatric:         Mood and Affect: Mood normal.         Behavior: Behavior normal. Behavior is cooperative.        Labs/Imaging   Latest Reference Range & Units 04/13/22 09:29 06/27/22 09:56 07/12/22 10:49   Glucose 70 - 130 mg/dL 270 (H)  434 !   Sodium 134 - 144 mmol/L 135     Potassium 3.5 - 5.2 mmol/L 4.3     CO2 20 - 29 mmol/L 20     Chloride 96 - 106 mmol/L 97     Creatinine 0.57 - 1.00 mg/dL 0.41 (L)     BUN 6 - 20 mg/dL 9     BUN/Creatinine Ratio 9 - 23  22     Calcium 8.7 - 10.2 mg/dL 9.4     EGFR Result >59 mL/min/1.73 141     Alkaline Phosphatase 44 - 121 IU/L 103     Total Protein 6.0 - 8.5 g/dL 7.5     ALT (SGPT) 0 - 32 IU/L 21     AST (SGOT) 0 - 40 IU/L 17     Total Bilirubin 0.0 - 1.2 mg/dL 0.8     Albumin 3.9 - 5.0 g/dL 4.4     A/G Ratio 1.2 - 2.2  1.4     Hemoglobin A1C 4.8 - " 5.6 % 10.3 (H) 10.2 (H)    C-Peptide 1.1 - 4.4 ng/mL 3.6     TSH Baseline 0.450 - 4.500 uIU/mL 1.740 1.630    Free T4 0.82 - 1.77 ng/dL 1.37 1.20    Total Cholesterol 100 - 199 mg/dL 197     HDL Cholesterol >39 mg/dL 39 (L)     LDL Cholesterol  0 - 99 mg/dL 118 (H)     Triglycerides 0 - 149 mg/dL 226 (H)     VLDL Cholesterol Kj 5 - 40 mg/dL 40     (H): Data is abnormally high  !: Data is abnormal  (L): Data is abnormally low    Assessment and Plan    Diagnoses and all orders for this visit:    1. Type 2 diabetes mellitus with hyperglycemia, unspecified whether long term insulin use (HCC) (Primary)  Uncontrolled with hemoglobin A1c 10.2% in June, too soon to review, patient hyperglycemic with glucose 434 during office visit  Discussed potential dangers of hyperglycemia, patient denies any acute symptoms such as nausea, vomiting, lightheadedness.  We discussed that should patient develop acute symptoms in the setting of hyperglycemia she must seek care urgently in the ER.  Patient voiced understanding.  Discussed with patient the most efficient means of improving glycemic control would be initiation of insulin.  Patient agreeable to initiation of basal insulin.  Plan to start Levemir or formulary equivalent 10 units at bedtime.  Patient was given instructions to titrate up by 2 units weekly until fasting glucose less than 95  Plan to increase Rybelsus to 7 mg daily  Plan to continue metformin 1000 mg twice daily  Discussed potential risks of uncontrolled diabetes in pregnancy.  Patient was advised to utilize reliable contraception while glucose remains elevated.  Discussed goal hemoglobin A1c prior to pregnancy less than 6.5.  Plan to obtain serum pregnancy test today.  Discussed with patient is currently taking medications which are not approved for use in pregnancy.  Reviewed risk of congenital malformation, pregnancy loss.  Patient was advised to monitor blood sugar 2 times daily.  Patient was instructed to bring  glucometer to all future appointments. Patient should contact the clinic between appointments with hypoglycemia or persistent hyperglycemia.  Discussed signs and symptoms of hypoglycemia as well as hypoglycemia management via the rule of 15's.  Discussed potential for long-term complications with uncontrolled diabetes including nephropathy, neuropathy, retinopathy, increased risk for cardiac disease.  Discussed the role of diet and exercise in the management of diabetes.   CMP is up-to-date from April 2022, EGFR, LFTs normal  Lipid panel up-to-date from April 2021, , triglycerides 226  -     POC Glucose, Blood    2. Missed period  Plan to obtain serum pregnancy test  -     hCG, Serum, Qualitative    Other orders  -     Semaglutide (Rybelsus) 7 MG tablet; Take 7 mg by mouth Daily.  Dispense: 30 tablet; Refill: 3  -     insulin detemir (Levemir FlexTouch) 100 UNIT/ML injection; Start 10 units at bedtime and titrate per instructions, MDD 30 units  Dispense: 9 mL; Refill: 3  -     Insulin Pen Needle 32G X 4 MM misc; For use with insulin injections, daily  Dispense: 50 each; Refill: 11    No follow-ups on file. The patient was instructed to contact the clinic with any interval questions or concerns.    Yu Donovan MD     Dictated Utilizing Dragon Dictation

## 2022-07-12 NOTE — TELEPHONE ENCOUNTER
"Returned patient call.  Patient states she is 47 days late for menses.  Patient stated her \"usual first signs of pregnancy\" is heartburn and migraine.  Patient stated she has had these symptoms.  Patient stated she has not yet taken a pregnancy test.  This nurse advised patient to take a pregnancy test and to return call with results.  Dr. Mueller notified, he agrees with plan.  Called patient back and patient stated she just saw Endocrinologist who ordered a serum hcg.  Test pending.  Patient stated she will notify us of results.  "

## 2022-07-13 LAB — HCG SERPL QL: NEGATIVE

## 2022-07-15 ENCOUNTER — TELEPHONE (OUTPATIENT)
Dept: ENDOCRINOLOGY | Facility: CLINIC | Age: 27
End: 2022-07-15

## 2022-08-02 ENCOUNTER — TELEPHONE (OUTPATIENT)
Dept: ONCOLOGY | Facility: CLINIC | Age: 27
End: 2022-08-02

## 2022-08-02 NOTE — TELEPHONE ENCOUNTER
Caller: Priti Osman    Relationship: Self    Best call back number: [phone]    What is the best time to reach you: ANYTIME    Who are you requesting to speak with (clinical staff, provider,  specific staff member): SCHEDULING    What was the call regarding: PT CALLING TO R/S HER 8/9 APPT - SHE NEEDS AN EARLY MORNING APPT TIME. CAN DO EITHER [facility] OR [facility] OFF[facility].    PLEASE CALL TO R/[facility].     Do you require a callback: YES

## 2022-08-04 ENCOUNTER — LAB (OUTPATIENT)
Dept: LAB | Facility: HOSPITAL | Age: 27
End: 2022-08-04

## 2022-08-04 ENCOUNTER — OFFICE VISIT (OUTPATIENT)
Dept: ONCOLOGY | Facility: CLINIC | Age: 27
End: 2022-08-04

## 2022-08-04 VITALS
DIASTOLIC BLOOD PRESSURE: 86 MMHG | RESPIRATION RATE: 18 BRPM | OXYGEN SATURATION: 97 % | TEMPERATURE: 97.2 F | HEIGHT: 66 IN | WEIGHT: 293 LBS | HEART RATE: 79 BPM | BODY MASS INDEX: 47.09 KG/M2 | SYSTOLIC BLOOD PRESSURE: 135 MMHG

## 2022-08-04 DIAGNOSIS — Z87.59: Chronic | ICD-10-CM

## 2022-08-04 DIAGNOSIS — R76.0 LUPUS ANTICOAGULANT POSITIVE: ICD-10-CM

## 2022-08-04 DIAGNOSIS — R76.0 LUPUS ANTICOAGULANT POSITIVE: Primary | Chronic | ICD-10-CM

## 2022-08-04 DIAGNOSIS — Z87.59: ICD-10-CM

## 2022-08-04 LAB — HCG INTACT+B SERPL-ACNC: <0.1 MIU/ML

## 2022-08-04 PROCEDURE — 85732 THROMBOPLASTIN TIME PARTIAL: CPT

## 2022-08-04 PROCEDURE — 86038 ANTINUCLEAR ANTIBODIES: CPT

## 2022-08-04 PROCEDURE — 85670 THROMBIN TIME PLASMA: CPT

## 2022-08-04 PROCEDURE — 36415 COLL VENOUS BLD VENIPUNCTURE: CPT | Performed by: INTERNAL MEDICINE

## 2022-08-04 PROCEDURE — 85705 THROMBOPLASTIN INHIBITION: CPT

## 2022-08-04 PROCEDURE — 84702 CHORIONIC GONADOTROPIN TEST: CPT | Performed by: INTERNAL MEDICINE

## 2022-08-04 PROCEDURE — 85613 RUSSELL VIPER VENOM DILUTED: CPT

## 2022-08-04 PROCEDURE — 86146 BETA-2 GLYCOPROTEIN ANTIBODY: CPT

## 2022-08-04 PROCEDURE — 86147 CARDIOLIPIN ANTIBODY EA IG: CPT

## 2022-08-04 PROCEDURE — 99213 OFFICE O/P EST LOW 20 MIN: CPT | Performed by: INTERNAL MEDICINE

## 2022-08-04 RX ORDER — METHOCARBAMOL 750 MG/1
750 TABLET, FILM COATED ORAL EVERY 8 HOURS
COMMUNITY
Start: 2022-07-19 | End: 2022-09-23

## 2022-08-04 RX ORDER — SULFAMETHOXAZOLE AND TRIMETHOPRIM 800; 160 MG/1; MG/1
1 TABLET ORAL 2 TIMES DAILY WITH MEALS
COMMUNITY
Start: 2022-07-30 | End: 2022-09-23

## 2022-08-04 NOTE — PROGRESS NOTES
CHIEF COMPLAINT: Low back pain since fell in July with suppose a pinched nerve    Problem List:  Oncology/Hematology History Overview Note   1.  Lupus anticoagulant plus 5 first trimester miscarriages for which she had never had a work-up.    2.  COVID-19 spring 2021 with chronic respiratory difficulties on albuterol now seeing Dr. 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  3.  ADHD with anxiety and neurotic depression  4.  Gestational diabetes  5.  Obesity  6.  Dizziness  7.  Type 2 diabetes managed by Dr. Yoo      Hematology history timeline:  -4/13/2022 Lupus anticoagulant positive with dilute viper venom time 49.1 but the rest of the panel negative.  CBC and CMP unremarkable save for glucose 270.    -6/7/2022 [facility] consult: Dilute viper venom time abnormal suggestive of possible lupus anticoagulant.  For this to be considered a truly positive test it needs to be repeatable and to be considered antiphospholipid antibody syndrome she needs to meet the Sapporo criteria of which recurrent fetal wastage in the first trimester could be 1 component but it would also be more likely if she had a an abnormal beta-2 glycoprotein 1 and/or anticardiolipin antibody.  I will check this panel and see her back.  In addition, given that she is not practicing birth control and is 12 days late on her period,  we will check a pregnancy test.  She is going to practice barrier method.  She is due to see Dr. Pillai in July and he has pulmonary function tests ordered.  Chest x-ray was unremarkable in April.  She is oxygenating well in the office and does not desaturate with exercise and hence I will not push the issue with a CT PE protocol at this junction though he may ultimately perform that pending his work-up.  I will check a D-dimer and if that is positive then I might proceed on with CT PE protocol for completeness sake even before she  sees Dr. Pillai.    -6/7/2022 data:  Baseline PTT normal 30.2 but with dilute Camron viper venom time modestly elevated at 48.8 and DRV VT and DRVVM positive at 42.3 upper limit of 40.4 with ratio 1.3 upper limit of 1.2 borderline positive for presence of lupus anticoagulant.    Anticardiolipin antibody negative.  Beta-2 glycoprotein 1 negative.  D-dimer normal 0.3.  Urine pregnancy test negative.      -6/14/2022 [facility] follow-up: Her dilute Camron viper venom time ratios have been modestly abnormal twice but only 8 weeks apart and technically this needs to be >/= 12 or more weeks apart and her first test was done mid April and the second 1 done mid June 2022..  Nonetheless, with 5 unexplained first trimester miscarriages, it would not be a major harm to consider primary prophylaxis with aspirin until this all clears up.  This would not hinder a repeat lupus anticoagulant test the end of July to early August which would clearly be persistently positive if present at that point.  She does not have anticardiolipin antibody or beta-2 glycoprotein 1 but a positive lupus anticoagulant in the presence of recurrent fetal wastage in the first trimester otherwise unexplained without direct evidence of placental insufficiency which would be hard to document in the absence of delivery or invasive obstetric examination or presence of uterine malformations leading to miscarriages would fit Sapporo criteria for antiphospholipid antibody syndrome which, in a patient who has never had symptoms save for recurrent fetal wastage, would be managed either either with observation per some experts or perhaps get primary prophylaxis with aspirin.  I tend to be in favor of the enteric-coated baby aspirin which is relatively safe and not a bad idea in a patient who is obese with other risk factors.  I see no indications for dyspnea and her D-dimer was normal.  She follows up with Dr. Pillai who think she has post-COVID  wheezing.  Her urine pregnancy test was negative but she is still 20 days late so I will check a serum pregnancy test.  In addition to repeating the lupus anticoagulant in about 6 weeks, with her malar rash I will check her ILAN and extractable nuclear antigens along with sedimentation rate and C-reactive protein now and go over that along with her serum pregnancy test.  I have asked her to practice safe sex by way of barrier method to avoid risk of pro coagulation from birth control pills but also, especially if she is considering pregnancy, would have her consult with high risk maternal-fetal medicine for management and if she becomes pregnant (or even his purposing  to become pregnant ), would want her placed on Lovenox through her pregnancy and until she is done breast-feeding.  In the absence of tomy VTE, I would not keep her anticoagulated postpartum/post breast-feeding.  Though her D-dimer is negative, in light of her negative pregnancy test with normal renal function in April, if her serum pregnancy test is also negative, I will go ahead with a CT PE protocol for completeness sake given her ongoing dyspnea otherwise unexplained despite adequate oxygenation.  She sees Dr. Pillai back mid July.    -6/14/2022 ILAN not performed by [facility] as ordered.  Extractable nuclear antigens negative.  Sedimentation rate normal 20.  C-reactive protein normal 1.2.  Quantitative hCG less than 2.  Not pregnant.  Qualitative hCG also negative.    -6/21/2022 [facility] follow-up: I reviewed the above labs with her.  A few days back in the heat she developed what she thinks was a panic attack and a syncopal episode from hyperventilation but she still saying it is hard to catch her breath and having bilateral shoulder pain.  I have called the emergency room at [facility] to update them on all of the above data since I have seen her and I will go ahead and send her to the emergency room now for  "completeness sake to be sure that she is not having PE.  I asked him to do CT PE protocol and Dopplers of the legs and D-dimer and she sees Dr. [facility] in July.  Assuming no evidence of venous thromboembolic disease in the chest or legs, I would not anticoagulate her but would just continue with the prophylactic baby aspirin and avoid pregnancy.  She has started a little bit of spotting but not full-blown.  Yet but both serum and urine testing for pregnancy has been negative.  I will repeat her antiphospholipid antibody panel prior to return to me early August.     History of 5 spontaneous abortions       HISTORY OF PRESENT ILLNESS:  The patient is a 27 y.o. female, here for follow up on management of lupus anticoagulant with recurrent first trimester miscarriages and subsequent finding of right upper lobe nodule on CT and possible splenomegaly 17 cm on CT work-up for possible PE.  No PE or DVT.    Past Medical History:   Diagnosis Date   • ADHD    • Anxiety    • Insomnia    • Neurotic depression    • Obesity    • Type 2 diabetes mellitus (HCC)      Past Surgical History:   Procedure Laterality Date   • TONSILLECTOMY         Allergies   Allergen Reactions   • Adhesive Tape Hives       Family History and Social History reviewed and changed as necessary    REVIEW OF SYSTEM:   Other than low back pain, no complaints    PHYSICAL EXAM:  No acute distress.  Lungs clear.  No palpable hepatosplenomegaly but difficult to assess with body habitus.    Vitals:    08/04/22 0853   BP: 135/86   Pulse: 79   Resp: 18   Temp: 97.2 °F (36.2 °C)   SpO2: 97%   Weight: (!) 143 kg (316 lb)   Height: 167.6 cm (66\")     Vitals:    08/04/22 0853   PainSc: 0-No pain          ECOG score: 0           Vitals reviewed.      Lab Results   Component Value Date    HGB 13.9 04/13/2022    HCT 41.9 04/13/2022    MCV 88 04/13/2022     04/13/2022    WBC 10.7 04/13/2022    NEUTROABS 7.8 (H) 04/13/2022    LYMPHSABS " 2.3 04/13/2022    MONOSABS 0.4 04/13/2022    EOSABS 0.1 04/13/2022    BASOSABS 0.1 04/13/2022       Lab Results   Component Value Date    GLUCOSE 270 (H) 04/13/2022    BUN 9 04/13/2022    CREATININE 0.41 (L) 04/13/2022     04/13/2022    K 4.3 04/13/2022    CL 97 04/13/2022    CO2 20 04/13/2022    CALCIUM 9.4 04/13/2022    PROTEINTOT 7.8 07/30/2021    ALBUMIN 4.4 04/13/2022    BILITOT 0.8 04/13/2022    ALKPHOS 103 04/13/2022    AST 17 04/13/2022    ALT 21 04/13/2022             ASSESSMENT & PLAN:  1.  Lupus anticoagulant plus 5 first trimester miscarriages for which she had never had a work-up.    2.  COVID-19 spring 2021 with chronic respiratory difficulties on albuterol now seeing Dr. 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  3.  ADHD with anxiety and neurotic depression  4.  Gestational diabetes  5.  Obesity  6.  Dizziness  7.  Type 2 diabetes managed by Dr. Yoo      Hematology history timeline:  -4/13/2022 Lupus anticoagulant positive with dilute viper venom time 49.1 but the rest of the panel negative.  CBC and CMP unremarkable save for glucose 270.    -6/7/2022 [facility] hematology consult: Dilute viper venom time abnormal suggestive of possible lupus anticoagulant.  For this to be considered a truly positive test it needs to be repeatable and to be considered antiphospholipid antibody syndrome she needs to meet the Sapp[place] criteria of which recurrent fetal wastage in the first trimester could be 1 component but it would also be more likely if she had a an abnormal beta-2 glycoprotein 1 and/or anticardiolipin antibody.  I will check this panel and see her back.  In addition, given that she is not practicing birth control and is 12 days late on her period,  we will check a pregnancy test.  She is going to practice barrier method.  She is due to see Dr. Pillai in July and he has pulmonary function tests ordered.  Chest x-ray  was unremarkable in April.  She is oxygenating well in the office and does not desaturate with exercise and hence I will not push the issue with a CT PE protocol at this junction though he may ultimately perform that pending his work-up.  I will check a D-dimer and if that is positive then I might proceed on with CT PE protocol for completeness sake even before she sees Dr. Pillai.    -6/7/2022 data:  Baseline PTT normal 30.2 but with dilute Camron viper venom time modestly elevated at 48.8 and DRV VT and DRVVM positive at 42.3 upper limit of 40.4 with ratio 1.3 upper limit of 1.2 borderline positive for presence of lupus anticoagulant.    Anticardiolipin antibody negative.  Beta-2 glycoprotein 1 negative.  D-dimer normal 0.3.  Urine pregnancy test negative.      -6/14/2022 [facility] follow-up: Her dilute Camron viper venom time ratios have been modestly abnormal twice but only 8 weeks apart and technically this needs to be >/= 12 or more weeks apart and her first test was done mid April and the second 1 done mid June 2022..  Nonetheless, with 5 unexplained first trimester miscarriages, it would not be a major harm to consider primary prophylaxis with aspirin until this all clears up.  This would not hinder a repeat lupus anticoagulant test the end of July to early August which would clearly be persistently positive if present at that point.  She does not have anticardiolipin antibody or beta-2 glycoprotein 1 but a positive lupus anticoagulant in the presence of recurrent fetal wastage in the first trimester otherwise unexplained without direct evidence of placental insufficiency which would be hard to document in the absence of delivery or invasive obstetric examination or presence of uterine malformations leading to miscarriages would fit Sapporo criteria for antiphospholipid antibody syndrome which, in a patient who has never had symptoms save for recurrent fetal wastage, would be managed either  either with observation per some experts or perhaps get primary prophylaxis with aspirin.  I tend to be in favor of the enteric-coated baby aspirin which is relatively safe and not a bad idea in a patient who is obese with other risk factors.  I see no indications for dyspnea and her D-dimer was normal.  She follows up with Dr. Pillai who think she has post-COVID wheezing.  Her urine pregnancy test was negative but she is still 20 days late so I will check a serum pregnancy test.  In addition to repeating the lupus anticoagulant in about 6 weeks, with her malar rash I will check her ILAN and extractable nuclear antigens along with sedimentation rate and C-reactive protein now and go over that along with her serum pregnancy test.  I have asked her to practice safe sex by way of barrier method to avoid risk of pro coagulation from birth control pills but also, especially if she is considering pregnancy, would have her consult with high risk maternal-fetal medicine for management and if she becomes pregnant (or even his purposing  to become pregnant ), would want her placed on Lovenox through her pregnancy and until she is done breast-feeding.  In the absence of tomy VTE, I would not keep her anticoagulated postpartum/post breast-feeding.  Though her D-dimer is negative, in light of her negative pregnancy test with normal renal function in April, if her serum pregnancy test is also negative, I will go ahead with a CT PE protocol for completeness sake given her ongoing dyspnea otherwise unexplained despite adequate oxygenation.  She sees Dr. Pillai back mid July.    -6/14/2022 ILAN not performed by [facility] as ordered.  Extractable nuclear antigens negative.  Sedimentation rate normal 20.  C-reactive protein normal 1.2.  Quantitative hCG less than 2.  Not pregnant.  Qualitative hCG also negative.    -6/21/2022 [facility] follow-up: I reviewed the above labs with her.  A few days back in the heat she  developed what she thinks was a panic attack and a syncopal episode from hyperventilation but she still saying it is hard to catch her breath and having bilateral shoulder pain.  I have called the emergency room at [facility] to update them on all of the above data since I have seen her and I will go ahead and send her to the emergency room now for completeness sake to be sure that she is not having PE.  I asked him to do CT PE protocol and Dopplers of the legs and D-dimer and she sees Dr. [facility] with [facility] in July.  Assuming no evidence of venous thromboembolic disease in the chest or legs, I would not anticoagulate her but would just continue with the prophylactic baby aspirin and avoid pregnancy.  She has started a little bit of spotting but not full-blown.  Yet but both serum and urine testing for pregnancy has been negative.  I will repeat her antiphospholipid antibody panel prior to return to me early August.    -6/21/2022 bilateral lower extremity Doppler negative in ER.  CT angiogram of chest showed 1.5 cm noncalcified right upper lobe nodule with no pulmonary embolism.  Peripheral pulmonary arteries not well opacified spleen at 17 cm.  INR 0.93.  CBC and differential normal.  Glucose 369 otherwise unremarkable CMP and negative troponins.    -8/4/2022 [facility] follow-up: She did not get an antiphospholipid antibody panel done since last I saw her as ordered.  No PE or DVT in emergency room in June per imaging as outlined above.  Fell since last I saw her and injured her back with a pinched nerve.  CT showed coincidental right upper lobe nodule and she due to the fall did not keep her appointment with Dr. Pillai and I have asked her to get in with him relative to the lung nodule.  I am sure we will repeat the CAT scans down the road at the bare minimum but before ordering repeat CTs etc. I will see what Dr. Pillai has to say and I will get her antiphospholipid  antibody panel again today.  As stated previously, it is hard to imagine she has antiphospholipid antibody syndrome with an isolated slightly elevated dilute Camron viper venom time and nothing more but we will see what the repeat panel shows.  We will also get her ILAN.  If spleen enlargement persists we will work that up further.    Total time of care today 15 minutes  : Addy Mueller MD    08/04/2022

## 2022-08-05 LAB
CARDIOLIPIN IGG SER IA-ACNC: <9 GPL U/ML (ref 0–14)
CARDIOLIPIN IGM SER IA-ACNC: <9 MPL U/ML (ref 0–12)

## 2022-08-06 LAB
APTT SCREEN TO CONFIRM RATIO: 1.07 RATIO (ref 0–1.34)
CONFIRM APTT/NORMAL: 37.1 SEC (ref 0–47.6)
LA 2 SCREEN W REFLEX-IMP: NORMAL
SCREEN APTT: 31.8 SEC (ref 0–51.9)
SCREEN DRVVT: 44.8 SEC (ref 0–47)
THROMBIN TIME: 20.3 SEC (ref 0–23)

## 2022-08-07 LAB
ANA SER QL IF: NEGATIVE
B2 GLYCOPROT1 IGA SER-ACNC: 19 GPI IGA UNITS (ref 0–25)
B2 GLYCOPROT1 IGG SER-ACNC: <9 GPI IGG UNITS (ref 0–20)
B2 GLYCOPROT1 IGM SER-ACNC: <9 GPI IGM UNITS (ref 0–32)

## 2022-08-15 ENCOUNTER — TELEPHONE (OUTPATIENT)
Dept: ONCOLOGY | Facility: CLINIC | Age: 27
End: 2022-08-15

## 2022-08-15 NOTE — TELEPHONE ENCOUNTER
Called patient back she reports when she woke up this morning she was having chest heaviness feeling like she couldn't breath, she has used her inhaler as much as she can today. Instructed patient she needs to be evaluted in the ER if she is having SOA that is not improving, she verbalized understanding. Dr. Mueller aware of plan and in agreement.

## 2022-08-15 NOTE — TELEPHONE ENCOUNTER
Patient called she is having trouble catching her breath, feels like something is sitting on her chest. She has used her inhaler as much as she can today. Please call.

## 2022-09-21 ENCOUNTER — TELEPHONE (OUTPATIENT)
Dept: FAMILY MEDICINE CLINIC | Facility: CLINIC | Age: 27
End: 2022-09-21

## 2022-09-23 ENCOUNTER — OFFICE VISIT (OUTPATIENT)
Dept: PULMONOLOGY | Facility: CLINIC | Age: 27
End: 2022-09-23

## 2022-09-23 VITALS
OXYGEN SATURATION: 97 % | WEIGHT: 293 LBS | HEART RATE: 90 BPM | BODY MASS INDEX: 47.09 KG/M2 | DIASTOLIC BLOOD PRESSURE: 96 MMHG | HEIGHT: 66 IN | TEMPERATURE: 98.9 F | SYSTOLIC BLOOD PRESSURE: 148 MMHG

## 2022-09-23 DIAGNOSIS — R06.02 SHORTNESS OF BREATH: Primary | ICD-10-CM

## 2022-09-23 DIAGNOSIS — R91.1 PULMONARY NODULE: ICD-10-CM

## 2022-09-23 DIAGNOSIS — J45.20 MILD INTERMITTENT ASTHMA WITHOUT COMPLICATION: ICD-10-CM

## 2022-09-23 PROCEDURE — 94729 DIFFUSING CAPACITY: CPT | Performed by: INTERNAL MEDICINE

## 2022-09-23 PROCEDURE — 94618 PULMONARY STRESS TESTING: CPT | Performed by: INTERNAL MEDICINE

## 2022-09-23 PROCEDURE — 99214 OFFICE O/P EST MOD 30 MIN: CPT | Performed by: INTERNAL MEDICINE

## 2022-09-23 PROCEDURE — 94726 PLETHYSMOGRAPHY LUNG VOLUMES: CPT | Performed by: INTERNAL MEDICINE

## 2022-09-23 PROCEDURE — 94375 RESPIRATORY FLOW VOLUME LOOP: CPT | Performed by: INTERNAL MEDICINE

## 2022-09-23 NOTE — PROGRESS NOTES
"Follow Up Office Note       Patient Name: Priti Osman    Referring Physician: No ref. provider found    Chief Complaint:    Chief Complaint   Patient presents with   • Abnormal Imaging   • Follow-up       History of Present Illness: Priti Osman is a 27 y.o. female who is here today to follow-up care with Pulmonary.  Patient has a past medical history significant for diabetes mellitus and COVID-19.  Patient notes that she is using albuterol on a few occasions.  One time even going to the hospital in [place] which is when they did a CT scan of the chest showing a right upper lobe nodule.  States that she was unaware that that was present.  Denies any chest pain, nausea, fever, or chills.  She has no other acute complaints.    Review of Systems:   Review of Systems   Constitutional: Negative for chills, fatigue and fever.   HENT: Negative for congestion and voice change.    Eyes: Negative for blurred vision.   Respiratory: Negative for cough, shortness of breath and wheezing.    Cardiovascular: Negative for chest pain.   Skin: Negative for dry skin.   Hematological: Negative for adenopathy.   Psychiatric/Behavioral: Negative for agitation and depressed mood.       The following portions of the patient's history were reviewed and updated as appropriate: allergies, current medications, past family history, past medical history, past social history, past surgical history and problem list.    Physical Exam:  Vital Signs:   Vitals:    09/23/22 1503   BP: 148/96   Pulse: 90   Temp: 98.9 °F (37.2 °C)   SpO2: 97%  Comment: resting, room air   Weight: (!) 146 kg (322 lb)   Height: 166.4 cm (65.5\")       Physical Exam  Vitals and nursing note reviewed.   Constitutional:       General: She is not in acute distress.     Appearance: She is well-developed and normal weight. She is not ill-appearing or toxic-appearing.   HENT:      Head: Normocephalic and atraumatic.   Cardiovascular:      Rate and Rhythm: Normal rate and " regular rhythm.      Pulses: Normal pulses.      Heart sounds: Normal heart sounds. No murmur heard.    No friction rub. No gallop.   Pulmonary:      Effort: Pulmonary effort is normal. No respiratory distress.      Breath sounds: Normal breath sounds. No wheezing, rhonchi or rales.   Musculoskeletal:      Right lower leg: No edema.      Left lower leg: No edema.   Skin:     General: Skin is warm and dry.   Neurological:      Mental Status: She is alert and oriented to person, place, and time.           There is no immunization history on file for this patient.    Results Review:   -I personally viewed the patient's CT scan report from June 2022, which showed that there was a 1.5 cm right upper lobe noncalcified nodule.   - chest x-ray from 4/13/2022 shows no acute cardiopulmonary process  -I personally reviewed the patient's 6-minute walk test from 9/23/2022 which showed she was able to walk 396 m which is 90% of predicted.  Oxygenation started at 97% and maintained at that throughout.  Dyspnea scale started at 0 and up to 1.  There was no signs of desaturation.  -I personally reviewed the patient's pulmonary function testing from 9/23/2022 which showed no obstruction or restriction with a normal DLCO.  FEV1 is 113%, total lung capacity 109%, DLCO 75%.    Assessment / Plan:   1. Shortness of breath (Primary)  2. Mild intermittent asthma without complication  -Asthma is overall well controlled, currently utilizing albuterol on a as needed basis.  Think that is all that she needs for now.  We will continue the albuterol as needed.  If she continues to have frequent usage we will eventually escalate up to a inhaled corticosteroid.  I do want her to work on getting increased physical activity and exercise.    3. 1.5 cm right upper lobe noncalcified nodule (6/2022)  -The right upper lobe nodule I was able to review on the CT scan report.  I have also gone ahead and requested the CT images to be sent from [place].  I  think it is unlikely be malignant given her age.  But her autoimmune disease history does make that a viable option.  I will go ahead and also get a repeat CT scan to compare since it has been 3 months based on Fleischner guidelines.  And then after that time we will continue to follow as needed or potentially need a biopsy.  I did discuss all these option with her on today's visit.  I will plan to call her with the results of the CT scan.  And if it is stable in size or resolved I will plan to have her come back in 6 months.  If there needs to be any interventions done I will have her come back in 3 months.    Follow Up:   Return in about 3 months (around 12/23/2022) for CT Chest with next visit.       YECENIA Pillai, DO  Pulmonary and Critical Care Medicine  Note Electronically Signed    Part of this note may be an electronic transcription/translation of spoken language to printed text using the Dragon Dictation System.

## 2022-09-27 ENCOUNTER — OFFICE VISIT (OUTPATIENT)
Dept: ONCOLOGY | Facility: CLINIC | Age: 27
End: 2022-09-27

## 2022-09-27 VITALS
RESPIRATION RATE: 20 BRPM | HEIGHT: 66 IN | WEIGHT: 293 LBS | SYSTOLIC BLOOD PRESSURE: 162 MMHG | TEMPERATURE: 97.6 F | BODY MASS INDEX: 47.09 KG/M2 | DIASTOLIC BLOOD PRESSURE: 98 MMHG | OXYGEN SATURATION: 98 % | HEART RATE: 92 BPM

## 2022-09-27 DIAGNOSIS — Z87.59: Primary | Chronic | ICD-10-CM

## 2022-09-27 PROCEDURE — 99213 OFFICE O/P EST LOW 20 MIN: CPT | Performed by: INTERNAL MEDICINE

## 2022-09-27 NOTE — PROGRESS NOTES
CHIEF COMPLAINT: Follow-up transient elevation of lupus anticoagulant    Problem List:  Oncology/Hematology History Overview Note   1.  Transient and insignificant Lupus anticoagulant (transient mild elevation of dilute Camron viper venom time subsequently normalized and nothing else on lupus anticoagulant, beta-2 glycoprotein 1, anticardiolipin antibody) plus 5 first trimester miscarriages for which she had never had a work-up.    2.  COVID-19 spring 2021 with chronic respiratory difficulties on albuterol now seeing Dr. 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  3.  ADHD with anxiety and neurotic depression  4.  Gestational diabetes  5.  Obesity  6.  Dizziness  7.  Type 2 diabetes managed by Dr. Yoo  8.  Pulmonary Nodule      Hematology history timeline:  -4/13/2022 Lupus anticoagulant positive with dilute viper venom time 49.1 but the rest of the panel negative.  CBC and CMP unremarkable save for glucose 270.    -6/7/2022 [facility] consult: Dilute viper venom time abnormal suggestive of possible lupus anticoagulant.  For this to be considered a truly positive test it needs to be repeatable and to be considered antiphospholipid antibody syndrome she needs to meet the Sapporo criteria of which recurrent fetal wastage in the first trimester could be 1 component but it would also be more likely if she had a an abnormal beta-2 glycoprotein 1 and/or anticardiolipin antibody.  I will check this panel and see her back.  In addition, given that she is not practicing birth control and is 12 days late on her period,  we will check a pregnancy test.  She is going to practice barrier method.  She is due to see Dr. Pillai in July and he has pulmonary function tests ordered.  Chest x-ray was unremarkable in April.  She is oxygenating well in the office and does not desaturate with exercise and hence I will not push the issue with a CT  PE protocol at this junction though he may ultimately perform that pending his work-up.  I will check a D-dimer and if that is positive then I might proceed on with CT PE protocol for completeness sake even before she sees Dr. Pillai.    -6/7/2022 data:  Baseline PTT normal 30.2 but with dilute Camron viper venom time modestly elevated at 48.8 and DRV VT and DRVVM positive at 42.3 upper limit of 40.4 with ratio 1.3 upper limit of 1.2 borderline positive for presence of lupus anticoagulant.    Anticardiolipin antibody negative.  Beta-2 glycoprotein 1 negative.  D-dimer normal 0.3.  Urine pregnancy test negative.      -6/14/2022 [facility] follow-up: Her dilute Camron viper venom time ratios have been modestly abnormal twice but only 8 weeks apart and technically this needs to be >/= 12 or more weeks apart and her first test was done mid April and the second 1 done mid June 2022..  Nonetheless, with 5 unexplained first trimester miscarriages, it would not be a major harm to consider primary prophylaxis with aspirin until this all clears up.  This would not hinder a repeat lupus anticoagulant test the end of July to early August which would clearly be persistently positive if present at that point.  She does not have anticardiolipin antibody or beta-2 glycoprotein 1 but a positive lupus anticoagulant in the presence of recurrent fetal wastage in the first trimester otherwise unexplained without direct evidence of placental insufficiency which would be hard to document in the absence of delivery or invasive obstetric examination or presence of uterine malformations leading to miscarriages would fit Sapporo criteria for antiphospholipid antibody syndrome which, in a patient who has never had symptoms save for recurrent fetal wastage, would be managed either either with observation per some experts or perhaps get primary prophylaxis with aspirin.  I tend to be in favor of the enteric-coated baby aspirin which  is relatively safe and not a bad idea in a patient who is obese with other risk factors.  I see no indications for dyspnea and her D-dimer was normal.  She follows up with Dr. Pillai who think she has post-COVID wheezing.  Her urine pregnancy test was negative but she is still 20 days late so I will check a serum pregnancy test.  In addition to repeating the lupus anticoagulant in about 6 weeks, with her malar rash I will check her ILAN and extractable nuclear antigens along with sedimentation rate and C-reactive protein now and go over that along with her serum pregnancy test.  I have asked her to practice safe sex by way of barrier method to avoid risk of pro coagulation from birth control pills but also, especially if she is considering pregnancy, would have her consult with high risk maternal-fetal medicine for management and if she becomes pregnant (or even his purposing  to become pregnant ), would want her placed on Lovenox through her pregnancy and until she is done breast-feeding.  In the absence of tomy VTE, I would not keep her anticoagulated postpartum/post breast-feeding.  Though her D-dimer is negative, in light of her negative pregnancy test with normal renal function in April, if her serum pregnancy test is also negative, I will go ahead with a CT PE protocol for completeness sake given her ongoing dyspnea otherwise unexplained despite adequate oxygenation.  She sees Dr. Pillai back mid July.    -6/14/2022 ILAN not performed by [facility] as ordered.  Extractable nuclear antigens negative.  Sedimentation rate normal 20.  C-reactive protein normal 1.2.  Quantitative hCG less than 2.  Not pregnant.  Qualitative hCG also negative.    -6/21/2022 [facility] follow-up: I reviewed the above labs with her.  A few days back in the heat she developed what she thinks was a panic attack and a syncopal episode from hyperventilation but she still saying it is hard to catch her breath and having  bilateral shoulder pain.  I have called the emergency room at [facility] to update them on all of the above data since I have seen her and I will go ahead and send her to the emergency room now for completeness sake to be sure that she is not having PE.  I asked him to do CT PE protocol and Dopplers of the legs and D-dimer and she sees Dr. [facility] in July.  Assuming no evidence of venous thromboembolic disease in the chest or legs, I would not anticoagulate her but would just continue with the prophylactic baby aspirin and avoid pregnancy.  She has started a little bit of spotting but not full-blown.  Yet but both serum and urine testing for pregnancy has been negative.  I will repeat her antiphospholipid antibody panel prior to return to me early August.    -6/21/2022 bilateral lower extremity Doppler negative in ER.  CT angiogram of chest showed 1.5 cm noncalcified right upper lobe nodule with no pulmonary embolism.  Peripheral pulmonary arteries not well opacified spleen at 17 cm.    -8/4/2022 [facility] hemat[facility] follow-up: She did not get an antiphospholipid antibody panel done since last I saw her as ordered.  No PE or DVT in emergency room in June per imaging as outlined above.  Fell since last I saw her and injured her back with a pinched nerve.  CT showed coincidental right upper lobe nodule and she due to the fall did not keep her appointment with Dr. Pillai and I have asked her to get in with him relative to the lung nodule.  I am sure we will repeat the CAT scans down the road at the bare minimum but before ordering repeat CTs etc. I will see what Dr. Pillai has to say and I will get her antiphospholipid antibody panel again today.  As stated previously, it is hard to imagine she has antiphospholipid antibody syndrome with an isolated slightly elevated dilute Camron viper venom time and nothing more but we will see what the repeat panel shows.    -8/4/2022 [facility]  hematology follow-up: She did not get an antiphospholipid antibody panel done since last I saw her as ordered.  No PE or DVT in emergency room in  per imaging as outlined above.  Fell since last I saw her and injured her back with a pinched nerve.  CT showed coincidental right upper lobe nodule and she due to the fall did not keep her appointment with Dr. Pillai and I have asked her to get in with him relative to the lung nodule.  I am sure we will repeat the CAT scans down the road at the bare minimum but before ordering repeat CTs etc. I will see what Dr. Pillai has to say and I will get her antiphospholipid antibody panel again today.  As stated previously, it is hard to imagine she has antiphospholipid antibody syndrome with an isolated slightly elevated dilute Camron viper venom time and nothing more but we will see what the repeat panel shows.  We will also get her ILAN.  If spleen enlargement persists we will work that up further.    -2022 normal dilute Camron viper venom time with no lupus anticoagulant.Anticardiolipin antibody negative.  Beta-2 glycoprotein 1 negative.  Quantitative pregnancy test negative.  ILAN negative    - 2022 review of note from Dr. Puma pillai pulmonary medicine indicates asthma controlled.  He reviewed CT right upper lobe nodule and requested images from [place].  Low suspicion for malignancy at her age.  Autoimmune disease is a possibility.  Plan to go ahead and repeat CT given that it has been 3 months based on Fleischner guidelines decision on biopsy based on that.  If it is stable he will plan to have her back in 6 months.  If any interventions needed plan to have her back in 3 months.    -2022 [facility] follow-up: Her repeat lupus anticoagulant is entirely negative.  She has no hint of antiphospholipid antibody syndrome from a serologic standpoint.  I would not repeat those tests further.  Causes for recurrent spontaneous  if sought need  "to look elsewhere beyond antiphospholipid antibody syndrome as she does not meet the Sapporo criteria.  From the small lung nodule standpoint she will follow with Dr. Pillai and I will see her back in a few months to make sure her spleen is not significantly enlarged on repeat testing.  She has a negative ILAN and no clear-cut autoimmune disease.     History of 5 spontaneous abortions       HISTORY OF PRESENT ILLNESS:  The patient is a 27 y.o. female, here for follow up on management of no interval spontaneous abortions.  Has seen Dr. Pillai.  See summary of his note I included above.    Past Medical History:   Diagnosis Date   • ADHD    • Anxiety    • Insomnia    • Neurotic depression    • Obesity    • Type 2 diabetes mellitus (HCC)      Past Surgical History:   Procedure Laterality Date   • TONSILLECTOMY         Allergies   Allergen Reactions   • Adhesive Tape Hives       Family History and Social History reviewed and changed as necessary    REVIEW OF SYSTEM:   No new somatic complaints    PHYSICAL EXAM:  No palpable hepatosplenomegaly or lymphadenopathy in the cervical axillary or inguinal regions.    Vitals:    09/27/22 0746   BP: 162/98   Pulse: 92   Resp: 20   Temp: 97.6 °F (36.4 °C)   SpO2: 98%   Weight: (!) 144 kg (318 lb)   Height: 166.4 cm (65.5\")     Vitals:    09/27/22 0746   PainSc:   6   PainLoc: Foot  Comment: right foot          ECOG score: 0           Vitals reviewed.    ECOG: (0) Fully Active - Able to Carry On All Pre-disease Performance Without Restriction    Lab Results   Component Value Date    HGB 13.9 04/13/2022    HCT 41.9 04/13/2022    MCV 88 04/13/2022     04/13/2022    WBC 10.7 04/13/2022    NEUTROABS 7.8 (H) 04/13/2022    LYMPHSABS 2.3 04/13/2022    MONOSABS 0.4 04/13/2022    EOSABS 0.1 04/13/2022    BASOSABS 0.1 04/13/2022       Lab Results   Component Value Date    GLUCOSE 270 (H) 04/13/2022    BUN 9 04/13/2022    CREATININE 0.41 (L) 04/13/2022     04/13/2022    K 4.3 " 04/13/2022    CL 97 04/13/2022    CO2 20 04/13/2022    CALCIUM 9.4 04/13/2022    PROTEINTOT 7.8 07/30/2021    ALBUMIN 4.4 04/13/2022    BILITOT 0.8 04/13/2022    ALKPHOS 103 04/13/2022    AST 17 04/13/2022    ALT 21 04/13/2022             ASSESSMENT & PLAN:  1.  Transient and insignificant Lupus anticoagulant (transient mild elevation of dilute Camron viper venom time subsequently normalized and nothing else on lupus anticoagulant, beta-2 glycoprotein 1, anticardiolipin antibody) plus 5 first trimester miscarriages for which she had never had a work-up.    2.  COVID-19 spring 2021 with chronic respiratory difficulties on albuterol now seeing Dr. 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  3.  ADHD with anxiety and neurotic depression  4.  Gestational diabetes  5.  Obesity  6.  Dizziness  7.  Type 2 diabetes managed by Dr. Yoo  8.  Pulmonary Nodule      Hematology history timeline:  -4/13/2022 Lupus anticoagulant positive with dilute viper venom time 49.1 but the rest of the panel negative.  CBC and CMP unremarkable save for glucose 270.    -6/7/2022 [facility] hematology consult: Dilute viper venom time abnormal suggestive of possible lupus anticoagulant.  For this to be considered a truly positive test it needs to be repeatable and to be considered antiphospholipid antibody syndrome she needs to meet the S[place] criteria of which recurrent fetal wastage in the first trimester could be 1 component but it would also be more likely if she had a an abnormal beta-2 glycoprotein 1 and/or anticardiolipin antibody.  I will check this panel and see her back.  In addition, given that she is not practicing birth control and is 12 days late on her period,  we will check a pregnancy test.  She is going to practice barrier method.  She is due to see Dr. Pillai in July and he has pulmonary function tests ordered.  Chest x-ray was unremarkable in  April.  She is oxygenating well in the office and does not desaturate with exercise and hence I will not push the issue with a CT PE protocol at this junction though he may ultimately perform that pending his work-up.  I will check a D-dimer and if that is positive then I might proceed on with CT PE protocol for completeness sake even before she sees Dr. Pillai.    -6/7/2022 data:  Baseline PTT normal 30.2 but with dilute Camron viper venom time modestly elevated at 48.8 and DRV VT and DRVVM positive at 42.3 upper limit of 40.4 with ratio 1.3 upper limit of 1.2 borderline positive for presence of lupus anticoagulant.    Anticardiolipin antibody negative.  Beta-2 glycoprotein 1 negative.  D-dimer normal 0.3.  Urine pregnancy test negative.      -6/14/2022 [facility] hematology follow-up: Her dilute Camron viper venom time ratios have been modestly abnormal twice but only 8 weeks apart and technically this needs to be >/= 12 or more weeks apart and her first test was done mid April and the second 1 done mid June 2022..  Nonetheless, with 5 unexplained first trimester miscarriages, it would not be a major harm to consider primary prophylaxis with aspirin until this all clears up.  This would not hinder a repeat lupus anticoagulant test the end of July to early August which would clearly be persistently positive if present at that point.  She does not have anticardiolipin antibody or beta-2 glycoprotein 1 but a positive lupus anticoagulant in the presence of recurrent fetal wastage in the first trimester otherwise unexplained without direct evidence of placental insufficiency which would be hard to document in the absence of delivery or invasive obstetric examination or presence of uterine malformations leading to miscarriages would fit Sapporo criteria for antiphospholipid antibody syndrome which, in a patient who has never had symptoms save for recurrent fetal wastage, would be managed either either with observation  per some experts or perhaps get primary prophylaxis with aspirin.  I tend to be in favor of the enteric-coated baby aspirin which is relatively safe and not a bad idea in a patient who is obese with other risk factors.  I see no indications for dyspnea and her D-dimer was normal.  She follows up with Dr. Pillai who think she has post-COVID wheezing.  Her urine pregnancy test was negative but she is still 20 days late so I will check a serum pregnancy test.  In addition to repeating the lupus anticoagulant in about 6 weeks, with her malar rash I will check her ILAN and extractable nuclear antigens along with sedimentation rate and C-reactive protein now and go over that along with her serum pregnancy test.  I have asked her to practice safe sex by way of barrier method to avoid risk of pro coagulation from birth control pills but also, especially if she is considering pregnancy, would have her consult with high risk maternal-fetal medicine for management and if she becomes pregnant (or even his purposing  to become pregnant ), would want her placed on Lovenox through her pregnancy and until she is done breast-feeding.  In the absence of tomy VTE, I would not keep her anticoagulated postpartum/post breast-feeding.  Though her D-dimer is negative, in light of her negative pregnancy test with normal renal function in April, if her serum pregnancy test is also negative, I will go ahead with a CT PE protocol for completeness sake given her ongoing dyspnea otherwise unexplained despite adequate oxygenation.  She sees Dr. Pillai back mid July.    -6/14/2022 ILAN not performed by [facility] as ordered.  Extractable nuclear antigens negative.  Sedimentation rate normal 20.  C-reactive protein normal 1.2.  Quantitative hCG less than 2.  Not pregnant.  Qualitative hCG also negative.    -6/21/2022 [facility] hematology follow-up: I reviewed the above labs with her.  A few days back in the heat she developed what she thinks  was a panic attack and a syncopal episode from hyperventilation but she still saying it is hard to catch her breath and having bilateral shoulder pain.  I have called the emergency room at [facility] to update them on all of the above data since I have seen her and I will go ahead and send her to the emergency room now for completeness sake to be sure that she is not having PE.  I asked him to do CT PE protocol and Dopplers of the legs and D-dimer and she sees Dr. P[facility] Pillai with [facility] in July.  Assuming no evidence of venous thromboembolic disease in the chest or legs, I would not anticoagulate her but would just continue with the prophylactic baby aspirin and avoid pregnancy.  She has started a little bit of spotting but not full-blown.  Yet but both serum and urine testing for pregnancy has been negative.  I will repeat her antiphospholipid antibody panel prior to return to me early August.    -6/21/2022 bilateral lower extremity Doppler negative in ER.  CT angiogram of chest showed 1.5 cm noncalcified right upper lobe nodule with no pulmonary embolism.  Peripheral pulmonary arteries not well opacified spleen at 17 cm.    -8/4/2022 [facility] hematology follow-up: She did not get an antiphospholipid antibody panel done since last I saw her as ordered.  No PE or DVT in emergency room in June per imaging as outlined above.  Fell since last I saw her and injured her back with a pinched nerve.  CT showed coincidental right upper lobe nodule and she due to the fall did not keep her appointment with Dr. Pillai and I have asked her to get in with him relative to the lung nodule.  I am sure we will repeat the CAT scans down the road at the bare minimum but before ordering repeat CTs etc. I will see what Dr. Pillai has to say and I will get her antiphospholipid antibody panel again today.  As stated previously, it is hard to imagine she has antiphospholipid antibody syndrome with an isolated  slightly elevated dilute Camron viper venom time and nothing more but we will see what the repeat panel shows.    -8/4/2022 [facility] follow-up: She did not get an antiphospholipid antibody panel done since last I saw her as ordered.  No PE or DVT in emergency room in June per imaging as outlined above.  Fell since last I saw her and injured her back with a pinched nerve.  CT showed coincidental right upper lobe nodule and she due to the fall did not keep her appointment with Dr. Pillai and I have asked her to get in with him relative to the lung nodule.  I am sure we will repeat the CAT scans down the road at the bare minimum but before ordering repeat CTs etc. I will see what Dr. Pillai has to say and I will get her antiphospholipid antibody panel again today.  As stated previously, it is hard to imagine she has antiphospholipid antibody syndrome with an isolated slightly elevated dilute Camron viper venom time and nothing more but we will see what the repeat panel shows.  We will also get her ILAN.  If spleen enlargement persists we will work that up further.    -8/4/2022 normal dilute Camron viper venom time with no lupus anticoagulant.Anticardiolipin antibody negative.  Beta-2 glycoprotein 1 negative.  Quantitative pregnancy test negative.  ILAN negative    - 9/23/2022 review of note from Dr. Puma pillai pulmonary medicine indicates asthma controlled.  He reviewed CT right upper lobe nodule and requested images from [place].  Low suspicion for malignancy at her age.  Autoimmune disease is a possibility.  Plan to go ahead and repeat CT given that it has been 3 months based on Fleischner guidelines decision on biopsy based on that.  If it is stable he will plan to have her back in 6 months.  If any interventions needed plan to have her back in 3 months.    -9/27/2022 [facility] follow-up: Her repeat lupus anticoagulant is entirely negative.  She has no hint of antiphospholipid antibody  syndrome from a serologic standpoint.  I would not repeat those tests further.  Causes for recurrent spontaneous  if sought need to look elsewhere beyond antiphospholipid antibody syndrome as she does not meet the Sapporo criteria.  From the small lung nodule standpoint she will follow with Dr. Pillai and I will see her back in a few months to make sure her spleen is not significantly enlarged on repeat testing.  She has a negative ILAN and no clear-cut autoimmune disease.    Total time of care today inclusive of time spent today prior to her arrival reviewing and cataloging interval data as outlined above and reviewing the note from pulmonary medicine and his plans for management and during visit translating that information and after visit arranging follow-up with me took 15 minutes of patient care time throughout the day today.  Addy Mueller MD    2022

## 2022-10-12 ENCOUNTER — TELEPHONE (OUTPATIENT)
Dept: FAMILY MEDICINE CLINIC | Facility: CLINIC | Age: 27
End: 2022-10-12

## 2022-10-12 NOTE — TELEPHONE ENCOUNTER
Caller: [facility]    Relationship: Other    Best call back number: [phone]    What form or medical record are you requesting: MOST RECENT OFFICE NOTES    Who is requesting this form or medical record from you: [facility]    How would you like to receive the form or medical records (pick-up, mail, fax): FAX    If fax, what is the fax number: [phone]    Timeframe paperwork needed: ASAP    Additional notes:

## 2022-10-21 ENCOUNTER — OFFICE VISIT (OUTPATIENT)
Dept: FAMILY MEDICINE CLINIC | Facility: CLINIC | Age: 27
End: 2022-10-21

## 2022-10-21 VITALS
OXYGEN SATURATION: 98 % | BODY MASS INDEX: 47.09 KG/M2 | HEIGHT: 66 IN | HEART RATE: 118 BPM | WEIGHT: 293 LBS | DIASTOLIC BLOOD PRESSURE: 80 MMHG | SYSTOLIC BLOOD PRESSURE: 140 MMHG

## 2022-10-21 DIAGNOSIS — E11.65 TYPE 2 DIABETES MELLITUS WITH HYPERGLYCEMIA, WITHOUT LONG-TERM CURRENT USE OF INSULIN: Primary | ICD-10-CM

## 2022-10-21 PROCEDURE — 99214 OFFICE O/P EST MOD 30 MIN: CPT | Performed by: FAMILY MEDICINE

## 2022-10-21 RX ORDER — INSULIN DETEMIR 100 [IU]/ML
INJECTION, SOLUTION SUBCUTANEOUS
Qty: 9 ML | Refills: 3 | Status: SHIPPED | OUTPATIENT
Start: 2022-10-21

## 2022-10-21 RX ORDER — ORAL SEMAGLUTIDE 7 MG/1
7 TABLET ORAL DAILY
Qty: 30 TABLET | Refills: 3 | Status: SHIPPED | OUTPATIENT
Start: 2022-10-21

## 2022-10-21 NOTE — PROGRESS NOTES
"Answers for HPI/ROS submitted by the patient on 10/14/2022  Please describe your symptoms.: I just need paperwork filled out for weightloss surgerys  Have you had these symptoms before?: No  How long have you been having these symptoms?: Greater than 2 weeks  What is the primary reason for your visit?: Other    Chief Complaint  Weight Loss (Weight loss surgery papers )    Subjective          Priti Osman presents to [facility]  History of Present Illness  Patient is a 27-year-old female who presents after she is getting established with [facility] for weight loss surgery.  She is here to get some paperwork filled out    She also recently saw the hematologist who does not think she has antiphospholipid or antilupus anticoagulant syndrome they are thinking her miscarriages are due to to her increased weight that should hopefully get better after she has a weight loss surgery    She also missed her endocrinology appointment has had to reschedule that she is stable on her diabetes for now      Objective   Vital Signs:   /80 (BP Location: Left arm, Patient Position: Sitting, Cuff Size: Large Adult)   Pulse 118   Ht 166.4 cm (65.5\")   Wt (!) 144 kg (317 lb)   SpO2 98%   BMI 51.95 kg/m²     Body mass index is 51.95 kg/m².    Review of Systems   Constitutional: Negative.    HENT: Negative.    Eyes: Negative.    Respiratory: Negative.    Cardiovascular: Negative.    Gastrointestinal: Negative.    Endocrine: Negative.    Genitourinary: Negative.    Musculoskeletal: Negative.    Skin: Negative.    Allergic/Immunologic: Negative.    Neurological: Negative.    Hematological: Negative.    Psychiatric/Behavioral: Negative.        Past History:  Medical History: has a past medical history of ADHD, Anxiety, Insomnia, Neurotic depression, Obesity, and Type 2 diabetes mellitus (HCC).   Surgical History: has a past surgical history that includes Tonsillectomy.   Family " History: family history includes ADD / ADHD in her father; Alcohol abuse in her father and sister; Allergies in her sister; Alzheimer's disease in an other family member; Asthma in an other family member; Cancer in an other family member; Depression in her father, sister, and another family member; Diabetes in her mother and another family member; Drug abuse in her father; Mental illness in her father and another family member; Obesity in her mother and another family member; Stroke in an other family member.   Social History: reports that she has never smoked. She has never used smokeless tobacco. She reports current alcohol use. She reports that she does not use drugs.      Current Outpatient Medications:   •  insulin detemir (Levemir FlexTouch) 100 UNIT/ML injection, Start 10 units at bedtime and titrate per instructions, MDD 30 units, Disp: 9 mL, Rfl: 3  •  Insulin Pen Needle 32G X 4 MM misc, For use with insulin injections, daily, Disp: 50 each, Rfl: 11  •  melatonin 5 MG tablet tablet, Take 10 mg by mouth., Disp: , Rfl:   •  metFORMIN (GLUCOPHAGE) 1000 MG tablet, Take 1 tablet by mouth 2 (Two) Times a Day With Meals., Disp: 60 tablet, Rfl: 5  •  Semaglutide (Rybelsus) 7 MG tablet, Take 7 mg by mouth Daily., Disp: 30 tablet, Rfl: 3    Allergies: Adhesive tape    Physical Exam  Constitutional:       Appearance: Normal appearance. She is normal weight.   HENT:      Head: Normocephalic and atraumatic.   Eyes:      Conjunctiva/sclera: Conjunctivae normal.   Cardiovascular:      Rate and Rhythm: Normal rate and regular rhythm.   Pulmonary:      Effort: Pulmonary effort is normal.      Breath sounds: Normal breath sounds.   Musculoskeletal:         General: Normal range of motion.      Cervical back: Normal range of motion and neck supple.   Skin:     General: Skin is warm and dry.   Neurological:      General: No focal deficit present.      Mental Status: She is alert and oriented to person, place, and time. Mental  status is at baseline.   Psychiatric:         Mood and Affect: Mood normal.         Behavior: Behavior normal.         Thought Content: Thought content normal.         Judgment: Judgment normal.          Result Review :                   Assessment and Plan    Diagnoses and all orders for this visit:    1. Type 2 diabetes mellitus with hyperglycemia, without long-term current use of insulin (HCC) (Primary)  -     Hemoglobin A1c; Future  We will go ahead and get repeat A1c refill on her medicine she does 20 units of insulin Rybelsus and metformin    2. BMI 50.0-59.9, adult (HCC)  We will go ahead and fill out the paperwork to start the process for her weight loss surgery      PATIENT HAS BEEN ADVISED WHILE ON NEW MEDICATION PRESCRIBED IT IS IMPORTANT TO USE BACK UP CONTRACEPTION OR BACK UP BIRTH CONTROL AS THE USE OF THIS MEDICATION WITH PREGNANCY COULD CAUSE POTENTIAL RISKS IF PATIENT DOES BECOME PREGNANT.    MEDICATION SIDE EFFECTS AND RISKS HAVE BEEN DISCUSSED WITH PATIENT. PATIENT NOTES UNDERSTANDING. IF ANY CONCERN OR QUESTION REGARDING MEDICATION PLEASE CONTACT.         Follow Up   No follow-ups on file.  Patient was given instructions and counseling regarding her condition or for health maintenance advice. Please see specific information pulled into the AVS if appropriate.     Angle Yoo DO

## 2022-10-22 LAB — HBA1C MFR BLD: 10.3 % (ref 4.8–5.6)

## 2022-10-25 ENCOUNTER — TELEPHONE (OUTPATIENT)
Dept: FAMILY MEDICINE CLINIC | Facility: CLINIC | Age: 27
End: 2022-10-25

## 2022-10-25 NOTE — TELEPHONE ENCOUNTER
----- Message from Angle Yoo DO sent at 10/24/2022  1:43 PM EDT -----  Please notify patient that there are abnormal lab work results.  Need an appointment for follow-up.

## 2022-11-14 RX ORDER — BUPROPION HYDROCHLORIDE 150 MG/1
TABLET ORAL
Qty: 30 TABLET | Refills: 0 | Status: SHIPPED | OUTPATIENT
Start: 2022-11-14

## 2022-12-15 ENCOUNTER — OFFICE VISIT (OUTPATIENT)
Dept: FAMILY MEDICINE CLINIC | Facility: CLINIC | Age: 27
End: 2022-12-15

## 2022-12-15 VITALS
HEART RATE: 84 BPM | SYSTOLIC BLOOD PRESSURE: 132 MMHG | RESPIRATION RATE: 15 BRPM | OXYGEN SATURATION: 98 % | BODY MASS INDEX: 47.09 KG/M2 | HEIGHT: 66 IN | TEMPERATURE: 97.1 F | DIASTOLIC BLOOD PRESSURE: 84 MMHG | WEIGHT: 293 LBS

## 2022-12-15 DIAGNOSIS — E66.01 MORBID (SEVERE) OBESITY DUE TO EXCESS CALORIES: ICD-10-CM

## 2022-12-15 DIAGNOSIS — E11.65 TYPE 2 DIABETES MELLITUS WITH HYPERGLYCEMIA, WITHOUT LONG-TERM CURRENT USE OF INSULIN: Primary | ICD-10-CM

## 2022-12-15 PROCEDURE — 99213 OFFICE O/P EST LOW 20 MIN: CPT | Performed by: FAMILY MEDICINE

## 2022-12-15 NOTE — PROGRESS NOTES
Follow Up Office Visit      Patient Name: Priti Osman  : 1995   MRN: [id number]     Chief Complaint:    Chief Complaint   Patient presents with   • Weight Loss       History of Present Illness: Priti Osman is a 27 y.o. female who is here today to   follow-up on her weight loss plan.  She does have bariatric surgery scheduled but has to have regular office visits she is working to cut down her portion size and says she does walking all day at work.  And otherwise doing well.    Past medical history significant for like 7 miscarriages they thought she had a blood disorders followed up with Dr. Mueller he said one of her test was normal the other 2 were abnormal so they are just following it for now.    Subjective      Review of Systems:   Review of Systems    Past Medical History:   Past Medical History:   Diagnosis Date   • ADHD    • Anxiety    • Insomnia    • Neurotic depression    • Obesity    • Type 2 diabetes mellitus (HCC)        Past Surgical History:   Past Surgical History:   Procedure Laterality Date   • TONSILLECTOMY         Family History:   Family History   Problem Relation Age of Onset   • Diabetes Mother    • Obesity Mother    • ADD / ADHD Father    • Depression Father    • Mental illness Father    • Alcohol abuse Father    • Drug abuse Father    • Alcohol abuse Sister    • Allergies Sister    • Depression Sister    • Cancer Other    • Mental illness Other    • Diabetes Other    • Stroke Other    • Alzheimer's disease Other    • Asthma Other    • Depression Other    • Obesity Other        Social History:   Social History     Socioeconomic History   • Marital status:    Tobacco Use   • Smoking status: Never   • Smokeless tobacco: Never   Vaping Use   • Vaping Use: Never used   Substance and Sexual Activity   • Alcohol use: Yes     Comment: 1 drink a month maybe   • Drug use: Never   • Sexual activity: Yes     Partners: Male       Medications:     Current Outpatient  "Medications:   •  buPROPion XL (WELLBUTRIN XL) 150 MG 24 hr tablet, TAKE 1 TABLET BY MOUTH EVERY DAY, Disp: 30 tablet, Rfl: 0  •  insulin detemir (Levemir FlexTouch) 100 UNIT/ML injection, Start 10 units at bedtime and titrate per instructions, MDD 30 units, Disp: 9 mL, Rfl: 3  •  Insulin Pen Needle 32G X 4 MM misc, For use with insulin injections, daily, Disp: 50 each, Rfl: 11  •  melatonin 5 MG tablet tablet, Take 10 mg by mouth., Disp: , Rfl:   •  metFORMIN (GLUCOPHAGE) 1000 MG tablet, Take 1 tablet by mouth 2 (Two) Times a Day With Meals., Disp: 60 tablet, Rfl: 5  •  Semaglutide (Rybelsus) 7 MG tablet, Take 7 mg by mouth Daily., Disp: 30 tablet, Rfl: 3    Allergies:   Allergies   Allergen Reactions   • Adhesive Tape Hives       Objective     Physical Exam:  Vital Signs:   Vitals:    12/15/22 1405   BP: 132/84   BP Location: Left arm   Patient Position: Sitting   Cuff Size: Adult   Pulse: 84   Resp: 15   Temp: 97.1 °F (36.2 °C)   TempSrc: Infrared   SpO2: 98%   Weight: (!) 145 kg (320 lb)   Height: 166.4 cm (65.5\")   PainSc: 0-No pain     Body mass index is 52.44 kg/m².     Physical Exam  Vitals and nursing note reviewed.   Constitutional:       Appearance: Normal appearance. She is obese.   HENT:      Head: Normocephalic and atraumatic.   Cardiovascular:      Rate and Rhythm: Normal rate and regular rhythm.   Pulmonary:      Effort: Pulmonary effort is normal.      Breath sounds: Normal breath sounds.   Musculoskeletal:         General: Normal range of motion.      Cervical back: Normal range of motion and neck supple.      Right lower leg: No edema.      Left lower leg: No edema.   Skin:     General: Skin is warm and dry.   Neurological:      General: No focal deficit present.      Mental Status: She is alert.         Procedures    PHQ-9 Total Score:       Assessment / Plan      Assessment/Plan:   Diagnoses and all orders for this visit:    1. Type 2 diabetes mellitus with hyperglycemia, without long-term " current use of insulin ([facility]) (Primary)    2. BMI 50.0-59.9, adult ([facility])         Encouraged to cut down on carbs avoid starches eat more fruits vegetables lean meats like grilled chicken breast grilled or baked not fried without the skin lean turkey fish etc. and cut back to one third or half cup of potato, Posta rice and bread 1 slice etc.  Follow-up in 1 month for regular weight and and follow-up.  Continue diabetic meds as directed I did mention possibly switching to Ozempic to help with better weight loss.  And her Rybelsus.    And she knows not to get pregnant on these medications.  Class 3 Severe Obesity (BMI >=40). Obesity-related health conditions include the following: none. Obesity is unchanged. BMI is is above average; BMI management plan is completed. We discussed low calorie, low carb based diet program, portion control and increasing exercise.      Follow Up:   Return in about 1 month (around 1/15/2023) for Recheck.        Rainer Chacon MD  [facility]   Portions of note created with Dragon voice recognition technology

## 2023-01-11 ENCOUNTER — OFFICE VISIT (OUTPATIENT)
Dept: FAMILY MEDICINE CLINIC | Facility: CLINIC | Age: 28
End: 2023-01-11
Payer: COMMERCIAL

## 2023-01-11 VITALS
DIASTOLIC BLOOD PRESSURE: 100 MMHG | BODY MASS INDEX: 47.09 KG/M2 | HEIGHT: 66 IN | HEART RATE: 101 BPM | WEIGHT: 293 LBS | OXYGEN SATURATION: 98 % | SYSTOLIC BLOOD PRESSURE: 122 MMHG

## 2023-01-11 DIAGNOSIS — R09.81 CONGESTION OF NASAL SINUS: ICD-10-CM

## 2023-01-11 DIAGNOSIS — E11.65 TYPE 2 DIABETES MELLITUS WITH HYPERGLYCEMIA, WITH LONG-TERM CURRENT USE OF INSULIN: Primary | ICD-10-CM

## 2023-01-11 DIAGNOSIS — J06.9 ACUTE URI: ICD-10-CM

## 2023-01-11 DIAGNOSIS — Z01.810 PREOP CARDIOVASCULAR EXAM: ICD-10-CM

## 2023-01-11 DIAGNOSIS — J02.9 SORETHROAT: ICD-10-CM

## 2023-01-11 DIAGNOSIS — Z79.4 TYPE 2 DIABETES MELLITUS WITH HYPERGLYCEMIA, WITH LONG-TERM CURRENT USE OF INSULIN: Primary | ICD-10-CM

## 2023-01-11 PROCEDURE — 99214 OFFICE O/P EST MOD 30 MIN: CPT | Performed by: FAMILY MEDICINE

## 2023-01-11 NOTE — PROGRESS NOTES
"Chief Complaint  Weight Check, Cough, and Nasal Congestion (Onset 1 day)    Subjective          Priti Osman presents to [facility]  History of Present Illness  Patient is a 27-year-old female presents for URI-like symptoms 1 day of sore throat congestion drainage mildly improved today.  No respiratory distress    She is here for her bariatric surgery weight and she needs referral to cardiology for cardiac preop clearance    She is doing well from a diabetes standpoint 20 units of long-acting insulin, metformin and the Rybelsus.  Has lost about 10 pounds she was to hold off on A1c until next month when she follows up          Objective   Vital Signs:   /100   Pulse 101   Ht 166.4 cm (65.51\")   Wt (!) 142 kg (312 lb)   SpO2 98%   BMI 51.11 kg/m²     Body mass index is 51.11 kg/m².    Review of Systems   Constitutional: Negative.    HENT: Positive for rhinorrhea, sinus pressure, sneezing and sore throat.    Eyes: Negative.    Respiratory: Negative.    Cardiovascular: Negative.    Gastrointestinal: Negative.    Endocrine: Negative.    Genitourinary: Negative.    Musculoskeletal: Negative.    Skin: Negative.    Allergic/Immunologic: Negative.    Neurological: Negative.    Hematological: Negative.    Psychiatric/Behavioral: Negative.        Past History:  Medical History: has a past medical history of ADHD, Anxiety, Insomnia, Neurotic depression, Obesity, and Type 2 diabetes mellitus (HCC).   Surgical History: has a past surgical history that includes Tonsillectomy.   Family History: family history includes ADD / ADHD in her father; Alcohol abuse in her father and sister; Allergies in her sister; Alzheimer's disease in an other family member; Asthma in an other family member; Cancer in an other family member; Depression in her father, sister, and another family member; Diabetes in her mother and another family member; Drug abuse in her father; Mental illness in her father " and another family member; Obesity in her mother and another family member; Stroke in an other family member.   Social History: reports that she has never smoked. She has never used smokeless tobacco. She reports current alcohol use. She reports that she does not use drugs.      Current Outpatient Medications:   •  buPROPion XL (WELLBUTRIN XL) 150 MG 24 hr tablet, TAKE 1 TABLET BY MOUTH EVERY DAY, Disp: 30 tablet, Rfl: 0  •  insulin detemir (Levemir FlexTouch) 100 UNIT/ML injection, Start 10 units at bedtime and titrate per instructions, MDD 30 units, Disp: 9 mL, Rfl: 3  •  Insulin Pen Needle 32G X 4 MM misc, For use with insulin injections, daily, Disp: 50 each, Rfl: 11  •  melatonin 5 MG tablet tablet, Take 10 mg by mouth., Disp: , Rfl:   •  metFORMIN (GLUCOPHAGE) 1000 MG tablet, Take 1 tablet by mouth 2 (Two) Times a Day With Meals., Disp: 60 tablet, Rfl: 5  •  Semaglutide (Rybelsus) 7 MG tablet, Take 7 mg by mouth Daily., Disp: 30 tablet, Rfl: 3    Allergies: Adhesive tape    Physical Exam  Constitutional:       Appearance: Normal appearance. She is normal weight.   HENT:      Head: Normocephalic and atraumatic.   Eyes:      Conjunctiva/sclera: Conjunctivae normal.   Cardiovascular:      Rate and Rhythm: Normal rate and regular rhythm.   Pulmonary:      Effort: Pulmonary effort is normal.      Breath sounds: Normal breath sounds.   Musculoskeletal:         General: Normal range of motion.      Cervical back: Normal range of motion and neck supple.   Skin:     General: Skin is warm and dry.   Neurological:      General: No focal deficit present.      Mental Status: She is alert and oriented to person, place, and time. Mental status is at baseline.   Psychiatric:         Mood and Affect: Mood normal.         Behavior: Behavior normal.         Thought Content: Thought content normal.         Judgment: Judgment normal.          Result Review :                   Assessment and Plan    Diagnoses and all orders for  this visit:    1. Type 2 diabetes mellitus with hyperglycemia, with long-term current use of insulin ([facility]) (Primary)  She wants to hold off on A1c until her next appointment continue current regimen and monitoring    2. Acute URI  Negative COVID flu and strep suspect acute URI continue conservative management with return precautions    3. Congestion of nasal sinus  -     POCT SARS-CoV-2 Antigen ADILENE + Flu  As above    4. Sorethroat  -     POCT rapid strep A  Conservative management    5. BMI 50.0-59.9, adult ([facility])  She follows with bariatric surgery appropriately    6. Preop cardiovascular exam  -     Ambulatory Referral to Cardiology  Referral to cardiology      MEDICATION SIDE EFFECTS, RISKS AND SIDE EFFECTS HAVE BEEN DISCUSSED WITH PATIENT. PATIENT NOTES UNDERSTANDING. IF ANY CONCERN OR QUESTION REGARDING MEDICATION PLEASE CONTACT.       Follow Up   No follow-ups on file.  Patient was given instructions and counseling regarding her condition or for health maintenance advice. Please see specific information pulled into the AVS if appropriate.     Angle Yoo DO  Answers for HPI/ROS submitted by the patient on 1/9/2023  Please describe your symptoms.: Weigh in for surgery  Have you had these symptoms before?: Yes  How long have you been having these symptoms?: Greater than 2 weeks  What is the primary reason for your visit?: Other

## 2023-01-24 ENCOUNTER — OFFICE VISIT (OUTPATIENT)
Dept: CARDIOLOGY | Facility: CLINIC | Age: 28
End: 2023-01-24
Payer: COMMERCIAL

## 2023-01-24 VITALS
OXYGEN SATURATION: 98 % | TEMPERATURE: 98 F | RESPIRATION RATE: 18 BRPM | SYSTOLIC BLOOD PRESSURE: 120 MMHG | HEIGHT: 65 IN | DIASTOLIC BLOOD PRESSURE: 78 MMHG | HEART RATE: 92 BPM | BODY MASS INDEX: 48.82 KG/M2 | WEIGHT: 293 LBS

## 2023-01-24 DIAGNOSIS — Z01.810 PREOPERATIVE CARDIOVASCULAR EXAMINATION: Primary | ICD-10-CM

## 2023-01-24 DIAGNOSIS — Z79.4 TYPE 2 DIABETES MELLITUS WITH HYPERGLYCEMIA, WITH LONG-TERM CURRENT USE OF INSULIN: ICD-10-CM

## 2023-01-24 DIAGNOSIS — E11.65 TYPE 2 DIABETES MELLITUS WITH HYPERGLYCEMIA, WITH LONG-TERM CURRENT USE OF INSULIN: ICD-10-CM

## 2023-01-24 DIAGNOSIS — E66.01 MORBID OBESITY WITH BMI OF 50.0-59.9, ADULT: ICD-10-CM

## 2023-01-24 PROCEDURE — 99203 OFFICE O/P NEW LOW 30 MIN: CPT

## 2023-01-24 PROCEDURE — 93000 ELECTROCARDIOGRAM COMPLETE: CPT

## 2023-01-24 NOTE — PROGRESS NOTES
[facility]  [facility]  [address] [place] [address]  Dept: [phone]  Dept Fax: [phone]    Priti Osman  1995    New Patient Office Note    History of Present Illness:  Priti Osman is a 27 y.o. female who presents to the clinic for Preoperative cardiovascular examination. She has PMH significant for DM, Obesity.  She is , no children.  Rare EtOH, denies drugs, smoking.  2-3 caffeine drinks per week.  Regular diet, walks 2 times weekly.  Full-time OD tech.  Mother-DM, father unknown history.  MGF  by MI 56, heavy smoker.  PGF-PPM.    She presents today as referral from PCP for evaluation preop cardiovascular examination, going for bariatric surgery Dr. Wolff [place].  He denies all major cardiac complaints today, no CP, SOB, LE edema, palpitations, orthopnea, PND.  Also denies cardiovascular history.  EKG today SR, HR 89 unremarkable.  She has been seen in the past by pulmonology, hematology, also had ED visit this  for difficulty breathing, had normal work-up at that time, negative CT for PE, normal duplex.  No major complaints of breathing difficulty today, she correlates the breathing compromise due to COVID infection which she states was .  PE today seems normal, /82 on recheck.  At this time with normal ECG, no complaints seems that she is low risk from cardiovascular standpoint to proceed with surgery.  No need for additional testing at this time.  We did spend some time discussing nutritional guidance, dietary recommendations given, exercise encouraged, and need for tighter control of her diabetes.  Advised that she would benefit from cardiac standpoint from weight loss.     The following portions of the patient's history were reviewed and updated as appropriate: allergies, current medications, past family history, past medical history, past social history, past surgical history, and problem  "list.    Medications:  buPROPion XL  Insulin Pen Needle misc  Levemir FlexTouch solution pen-injector  melatonin tablet  metFORMIN  Rybelsus tablet    Subjective  Allergies   Allergen Reactions   • Adhesive Tape Hives        Past Medical History:   Diagnosis Date   • ADHD    • Anxiety    • Insomnia    • Neurotic depression    • Obesity    • Type 2 diabetes mellitus (HCC)        Past Surgical History:   Procedure Laterality Date   • TONSILLECTOMY         Family History   Problem Relation Age of Onset   • Diabetes Mother    • Obesity Mother    • ADD / ADHD Father    • Depression Father    • Mental illness Father    • Alcohol abuse Father    • Drug abuse Father    • Alcohol abuse Sister    • Allergies Sister    • Depression Sister    • Cancer Other    • Mental illness Other    • Diabetes Other    • Stroke Other    • Alzheimer's disease Other    • Asthma Other    • Depression Other    • Obesity Other         Social History     Socioeconomic History   • Marital status:    Tobacco Use   • Smoking status: Never   • Smokeless tobacco: Never   Vaping Use   • Vaping Use: Never used   Substance and Sexual Activity   • Alcohol use: Yes     Comment: 1 drink a month maybe   • Drug use: Never   • Sexual activity: Yes     Partners: Male       Review of Systems   All other systems reviewed and are negative.      Cardiovascular Procedures    ECHO/MUGA:   STRESS TESTS:   CARDIAC CATH:   DEVICES:   HOLTER:   CT/MRI:   VASCULAR:   CARDIOTHORACIC:     Objective  Vitals:    01/24/23 1037   BP: 120/78   BP Location: Left arm   Patient Position: Sitting   Cuff Size: Large Adult   Pulse: 92   Resp: 18   Temp: 98 °F (36.7 °C)   TempSrc: Infrared   SpO2: 98%   Weight: (!) 143 kg (316 lb)   Height: 165.1 cm (65\")   PainSc: 0-No pain       Physical Exam  Vitals reviewed.   Constitutional:       Appearance: Healthy appearance. Not in distress.   Neck:      Vascular: No JVR. JVD normal.   Pulmonary:      Effort: Pulmonary effort is normal. "      Breath sounds: Normal breath sounds. No wheezing. No rhonchi. No rales.   Chest:      Chest wall: Not tender to palpatation.   Cardiovascular:      PMI at left midclavicular line. Normal rate. Regular rhythm. Normal S1. Normal S2.      Murmurs: There is no murmur.      No gallop. No click. No rub.   Pulses:     Intact distal pulses.   Edema:     Peripheral edema absent.   Abdominal:      General: Bowel sounds are normal.      Palpations: Abdomen is soft.      Tenderness: There is no abdominal tenderness.   Musculoskeletal: Normal range of motion.         General: No tenderness. Skin:     General: Skin is warm and dry.   Neurological:      General: No focal deficit present.      Mental Status: Alert and oriented to person, place and time.          Diagnostic Data    ECG 12 Lead    Date/Time: 1/24/2023 11:27 AM  Performed by: Amelia Beard APRN  Authorized by: Amelia Beard APRN   Comparison: not compared with previous ECG   Previous ECG: no previous ECG available  Rhythm: sinus rhythm  Rate: normal  BPM: 89  QRS axis: normal    Clinical impression: normal ECG            Assessment and Plan  Diagnoses and all orders for this visit:    1. Preoperative cardiovascular examination (Primary)  Going for bariatric surgery Dr. [place], she is low risk from cardiac standpoint to proceed for surgery and should benefit from weight loss.     2. Morbid obesity with BMI of 50.0-59.9, adult (HCC)  BMI 52.5, going for bariatric surgery as above.     3. Type 2 diabetes mellitus with hyperglycemia, with long-term current use of insulin ([facility])  Uncontrolled, A1C 10.3 October 2022. Managed by Endo/PCP.            Return if symptoms worsen or fail to improve, for Recheck.    HUGO Montes De Oca  01/24/2023

## 2023-02-03 ENCOUNTER — PATIENT ROUNDING (BHMG ONLY) (OUTPATIENT)
Dept: CARDIOLOGY | Facility: CLINIC | Age: 28
End: 2023-02-03
Payer: COMMERCIAL

## 2023-02-03 NOTE — PROGRESS NOTES
February 3, 2023    Hello, may I speak with Priti Osman?    My name is MARLO       I am  with [facility]  [facility]  [address] [place] [address].    Before we get started may I verify your date of birth? 1995    UNABLE TO REACH PATIENT

## 2023-02-03 NOTE — PROGRESS NOTES
February 3, 2023    Hello, may I speak with Priti Osman?    My name is MARLO      I am  with [facility]  [facility]  [address] [place] [address].    Before we get started may I verify your date of birth? 1995    I am calling to officially welcome you to our practice and ask about your recent visit. Is this a good time to talk? yes    Tell me about your visit with us. What things went well?  EVERYTHING WENT FINE       We're always looking for ways to make our patients' experiences even better. Do you have recommendations on ways we may improve?  no    Overall were you satisfied with your first visit to our practice? yes       I appreciate you taking the time to speak with me today. Is there anything else I can do for you? no      Thank you, and have a great day.

## 2023-02-07 ENCOUNTER — OFFICE VISIT (OUTPATIENT)
Dept: FAMILY MEDICINE CLINIC | Facility: CLINIC | Age: 28
End: 2023-02-07
Payer: COMMERCIAL

## 2023-02-07 VITALS
SYSTOLIC BLOOD PRESSURE: 140 MMHG | BODY MASS INDEX: 48.82 KG/M2 | WEIGHT: 293 LBS | DIASTOLIC BLOOD PRESSURE: 94 MMHG | OXYGEN SATURATION: 98 % | HEIGHT: 65 IN | HEART RATE: 106 BPM

## 2023-02-07 DIAGNOSIS — E11.65 TYPE 2 DIABETES MELLITUS WITH HYPERGLYCEMIA, WITH LONG-TERM CURRENT USE OF INSULIN: Primary | ICD-10-CM

## 2023-02-07 DIAGNOSIS — Z79.4 TYPE 2 DIABETES MELLITUS WITH HYPERGLYCEMIA, WITH LONG-TERM CURRENT USE OF INSULIN: Primary | ICD-10-CM

## 2023-02-07 LAB
EXPIRATION DATE: NORMAL
HBA1C MFR BLD: 9.9 %
Lab: NORMAL

## 2023-02-07 PROCEDURE — 99214 OFFICE O/P EST MOD 30 MIN: CPT | Performed by: FAMILY MEDICINE

## 2023-02-07 PROCEDURE — 83036 HEMOGLOBIN GLYCOSYLATED A1C: CPT | Performed by: FAMILY MEDICINE

## 2023-02-07 NOTE — PROGRESS NOTES
"Chief Complaint  Weight Loss    Subjective          Priti Osman presents to [facility]  History of Present Illness  Patient is a 27-year-old female who presents for her weigh-in.  She also got cardiac clearance she was deemed to be low risk from a cardiac standpoint and would benefit from weight loss surgery.  She states her last A1c was 10.3 she was currently on the Rybelsus and metformin but she does endorse that she forgets taking her oral pills that she is also on 20 units of long-acting insulin she is inquiring about seeing we can switch her over to just insulin altogether as it is easier for her to remember.  She does note her weight is up a few pounds but she is very bloated as she is on her period      Objective   Vital Signs:   /94   Pulse 106   Ht 165.1 cm (65\")   Wt (!) 143 kg (316 lb 1.6 oz)   SpO2 98%   BMI 52.60 kg/m²     Body mass index is 52.6 kg/m².    Review of Systems   Constitutional: Negative.    HENT: Negative.    Eyes: Negative.    Respiratory: Negative.    Cardiovascular: Negative.    Gastrointestinal: Negative.    Endocrine: Negative.    Genitourinary: Negative.    Musculoskeletal: Negative.    Skin: Negative.    Allergic/Immunologic: Negative.    Neurological: Negative.    Hematological: Negative.    Psychiatric/Behavioral: Negative.        Past History:  Medical History: has a past medical history of ADHD, Anxiety, Insomnia, Neurotic depression, Obesity, and Type 2 diabetes mellitus (HCC).   Surgical History: has a past surgical history that includes Tonsillectomy.   Family History: family history includes ADD / ADHD in her father; Alcohol abuse in her father and sister; Allergies in her sister; Alzheimer's disease in an other family member; Asthma in an other family member; Cancer in an other family member; Depression in her father, sister, and another family member; Diabetes in her mother and another family member; Drug abuse in her father; " Mental illness in her father and another family member; Obesity in her mother and another family member; Stroke in an other family member.   Social History: reports that she has never smoked. She has never used smokeless tobacco. She reports current alcohol use. She reports that she does not use drugs.      Current Outpatient Medications:   •  buPROPion XL (WELLBUTRIN XL) 150 MG 24 hr tablet, TAKE 1 TABLET BY MOUTH EVERY DAY, Disp: 30 tablet, Rfl: 0  •  insulin detemir (Levemir FlexTouch) 100 UNIT/ML injection, Start 10 units at bedtime and titrate per instructions, MDD 30 units, Disp: 9 mL, Rfl: 3  •  insulin lispro (humaLOG) 100 UNIT/ML injection, Inject 5 Units under the skin into the appropriate area as directed 3 (Three) Times a Day Before Meals., Disp: 20 mL, Rfl: 12  •  Insulin Pen Needle 32G X 4 MM misc, For use with insulin injections, daily, Disp: 50 each, Rfl: 11  •  melatonin 5 MG tablet tablet, Take 10 mg by mouth., Disp: , Rfl:   •  metFORMIN (GLUCOPHAGE) 1000 MG tablet, Take 1 tablet by mouth 2 (Two) Times a Day With Meals., Disp: 60 tablet, Rfl: 5  •  Semaglutide (Rybelsus) 7 MG tablet, Take 7 mg by mouth Daily., Disp: 30 tablet, Rfl: 3    Allergies: Adhesive tape    Physical Exam  Constitutional:       Appearance: Normal appearance. She is normal weight.   HENT:      Head: Normocephalic and atraumatic.   Eyes:      Conjunctiva/sclera: Conjunctivae normal.   Cardiovascular:      Rate and Rhythm: Normal rate and regular rhythm.   Pulmonary:      Effort: Pulmonary effort is normal.      Breath sounds: Normal breath sounds.   Musculoskeletal:         General: Normal range of motion.      Cervical back: Normal range of motion and neck supple.   Skin:     General: Skin is warm and dry.   Neurological:      General: No focal deficit present.      Mental Status: She is alert and oriented to person, place, and time. Mental status is at baseline.   Psychiatric:         Mood and Affect: Mood normal.          Behavior: Behavior normal.         Thought Content: Thought content normal.         Judgment: Judgment normal.          Result Review :                   Assessment and Plan    Diagnoses and all orders for this visit:    1. Type 2 diabetes mellitus with hyperglycemia, with long-term current use of insulin ([facility]) (Primary)  Her A1c has come down to 9.9.  She does not want to take any oral agents we have discussed we will bump her up to the 30 units of the long-acting and 5 units of short acting 3 times daily AC keep a log follow-up in 2 to 3 months with A1c    2. BMI 50.0-59.9, adult ([facility])  Scheduled for bariatric surgery    PATIENT HAS BEEN ADVISED WHILE ON NEW MEDICATION PRESCRIBED IT IS IMPORTANT TO USE BACK UP CONTRACEPTION OR BACK UP BIRTH CONTROL AS THE USE OF THIS MEDICATION WITH PREGNANCY COULD CAUSE POTENTIAL RISKS IF PATIENT DOES BECOME PREGNANT.    MEDICATION SIDE EFFECTS AND RISKS HAVE BEEN DISCUSSED WITH PATIENT. PATIENT NOTES UNDERSTANDING. IF ANY CONCERN OR QUESTION REGARDING MEDICATION PLEASE CONTACT.       Follow Up   No follow-ups on file.  Patient was given instructions and counseling regarding her condition or for health maintenance advice. Please see specific information pulled into the AVS if appropriate.     Angle Yoo DO  Answers for HPI/ROS submitted by the patient on 2/7/2023  Please describe your symptoms.: Weigh in  Have you had these symptoms before?: Yes  How long have you been having these symptoms?: 1-4 days  What is the primary reason for your visit?: Other

## 2024-01-02 ENCOUNTER — LAB (OUTPATIENT)
Dept: LAB | Facility: HOSPITAL | Age: 29
End: 2024-01-02
Payer: COMMERCIAL

## 2024-01-02 ENCOUNTER — TRANSCRIBE ORDERS (OUTPATIENT)
Dept: LAB | Facility: HOSPITAL | Age: 29
End: 2024-01-02
Payer: COMMERCIAL

## 2024-01-02 DIAGNOSIS — N96 HABITUAL ABORTER: Primary | ICD-10-CM

## 2024-01-02 DIAGNOSIS — N96 HABITUAL ABORTER: ICD-10-CM

## 2024-01-02 LAB
HBA1C MFR BLD: 8.8 % (ref 4.8–5.6)
TSH SERPL DL<=0.05 MIU/L-ACNC: 0.94 UIU/ML (ref 0.27–4.2)

## 2024-01-02 PROCEDURE — 85732 THROMBOPLASTIN TIME PARTIAL: CPT

## 2024-01-02 PROCEDURE — 86147 CARDIOLIPIN ANTIBODY EA IG: CPT

## 2024-01-02 PROCEDURE — 85613 RUSSELL VIPER VENOM DILUTED: CPT

## 2024-01-02 PROCEDURE — 85705 THROMBOPLASTIN INHIBITION: CPT

## 2024-01-02 PROCEDURE — 86146 BETA-2 GLYCOPROTEIN ANTIBODY: CPT

## 2024-01-02 PROCEDURE — 83036 HEMOGLOBIN GLYCOSYLATED A1C: CPT

## 2024-01-02 PROCEDURE — 85670 THROMBIN TIME PLASMA: CPT

## 2024-01-02 PROCEDURE — 36415 COLL VENOUS BLD VENIPUNCTURE: CPT

## 2024-01-02 PROCEDURE — 84443 ASSAY THYROID STIM HORMONE: CPT

## 2024-01-03 LAB
CARDIOLIPIN IGA SER IA-ACNC: 9 APL U/ML (ref 0–11)
CARDIOLIPIN IGG SER IA-ACNC: <9 GPL U/ML (ref 0–14)
CARDIOLIPIN IGM SER IA-ACNC: 11 MPL U/ML (ref 0–12)

## 2024-01-04 LAB
APTT SCREEN TO CONFIRM RATIO: 1.12 RATIO (ref 0–1.34)
CONFIRM APTT/NORMAL: 40.4 SEC (ref 0–47.6)
LA 2 SCREEN W REFLEX-IMP: NORMAL
SCREEN APTT: 34.8 SEC (ref 0–43.5)
SCREEN DRVVT: 46.4 SEC (ref 0–47)
THROMBIN TIME: 17.8 SEC (ref 0–23)

## 2024-01-05 LAB
B2 GLYCOPROT1 IGA SER-ACNC: 9 GPI IGA UNITS (ref 0–25)
B2 GLYCOPROT1 IGG SER-ACNC: <9 GPI IGG UNITS (ref 0–20)
B2 GLYCOPROT1 IGM SER-ACNC: <9 GPI IGM UNITS (ref 0–32)

## 2024-01-16 LAB — REF LAB TEST METHOD: NORMAL

## 2024-04-12 ENCOUNTER — OFFICE VISIT (OUTPATIENT)
Dept: FAMILY MEDICINE CLINIC | Facility: CLINIC | Age: 29
End: 2024-04-12
Payer: COMMERCIAL

## 2024-04-12 VITALS
RESPIRATION RATE: 15 BRPM | DIASTOLIC BLOOD PRESSURE: 74 MMHG | WEIGHT: 293 LBS | SYSTOLIC BLOOD PRESSURE: 118 MMHG | OXYGEN SATURATION: 97 % | HEIGHT: 66 IN | BODY MASS INDEX: 47.09 KG/M2 | HEART RATE: 76 BPM

## 2024-04-12 DIAGNOSIS — F90.9 ATTENTION DEFICIT HYPERACTIVITY DISORDER (ADHD), UNSPECIFIED ADHD TYPE: ICD-10-CM

## 2024-04-12 DIAGNOSIS — Z00.00 GENERAL MEDICAL EXAM: ICD-10-CM

## 2024-04-12 DIAGNOSIS — Z11.59 NEED FOR HEPATITIS C SCREENING TEST: ICD-10-CM

## 2024-04-12 DIAGNOSIS — E11.65 TYPE 2 DIABETES MELLITUS WITH HYPERGLYCEMIA, WITH LONG-TERM CURRENT USE OF INSULIN: Primary | ICD-10-CM

## 2024-04-12 DIAGNOSIS — Z13.220 SCREENING FOR HYPERLIPIDEMIA: ICD-10-CM

## 2024-04-12 DIAGNOSIS — F33.1 MODERATE EPISODE OF RECURRENT MAJOR DEPRESSIVE DISORDER: ICD-10-CM

## 2024-04-12 DIAGNOSIS — N97.9 INFERTILITY, FEMALE: ICD-10-CM

## 2024-04-12 DIAGNOSIS — Z79.4 TYPE 2 DIABETES MELLITUS WITH HYPERGLYCEMIA, WITH LONG-TERM CURRENT USE OF INSULIN: Primary | ICD-10-CM

## 2024-04-12 LAB
EXPIRATION DATE: ABNORMAL
HBA1C MFR BLD: 10 % (ref 4.5–5.7)
Lab: ABNORMAL
POC MICROALBUMIN URINE: 100

## 2024-04-12 RX ORDER — PROGESTERONE 100 MG/1
100 CAPSULE ORAL DAILY
COMMUNITY

## 2024-04-12 NOTE — ASSESSMENT & PLAN NOTE
Has not done well on SSRIs in the past.  Has a history of a couple of psych hospitalizations.  Also has ADHD per patient report.  Will go ahead and refer to behavioral health.

## 2024-04-12 NOTE — ASSESSMENT & PLAN NOTE
I had a discussion with patient about not being in such a hurry right now about getting pregnant.  She really needs to work on getting her diabetes control to have a healthy pregnancy and a healthy baby.  Working on controlling her diabetes and losing some weight may also help improve her fertility.

## 2024-04-12 NOTE — ASSESSMENT & PLAN NOTE
Diabetes has been poorly controlled for the past few years.  I discussed with patient the importance of keeping her appointments and complying with treatment.  Will go ahead and start Mounjaro planning on tapering to the 7.5 mg dose over 3 months.  Patient to follow-up here in about 10 weeks.  Will go ahead and get labs today and plan on a general medical exam at follow-up.

## 2024-04-12 NOTE — PROGRESS NOTES
Patient Office Visit      Patient Name: Priti Osman  : 1995   MRN: [id number]     Chief Complaint:    Chief Complaint   Patient presents with    Diabetes    Depression       History of Present Illness: Priti Osman is a 28 y.o. female who is here today to establish care with a new provider in this office  She has a long history of infertility and has been seeing a fertility specialist.  She has had multiple miscarriages.  Workup for lupus anticoagulant with hematology has been negative.  She quit taking all of her diabetes medication.  She is interested in Mounjaro or Ozempic.  She also said bupropion not working well for her depression.  She said she is also struggling a lot with her ADHD.    Subjective      Review of Systems:         Past Medical History:   Past Medical History:   Diagnosis Date    ADHD     Anxiety     Insomnia     Neurotic depression     Obesity     Type 2 diabetes mellitus        Past Surgical History:   Past Surgical History:   Procedure Laterality Date    TONSILLECTOMY         Family History:   Family History   Problem Relation Age of Onset    Diabetes Mother     Obesity Mother     ADD / ADHD Father     Depression Father     Mental illness Father     Alcohol abuse Father     Drug abuse Father     Alcohol abuse Sister     Allergies Sister     Depression Sister     Cancer Other     Mental illness Other     Diabetes Other     Stroke Other     Alzheimer's disease Other     Asthma Other     Depression Other     Obesity Other        Social History:   Social History     Socioeconomic History    Marital status:    Tobacco Use    Smoking status: Never    Smokeless tobacco: Never   Vaping Use    Vaping status: Never Used   Substance and Sexual Activity    Alcohol use: Yes     Comment: 1 drink a month maybe    Drug use: Never    Sexual activity: Yes     Partners: Male       Allergies:   Allergies   Allergen Reactions    Adhesive Tape Hives       Objective     Physical  "Exam:  Vital Signs:   Vitals:    04/12/24 0808   BP: 118/74   BP Location: Left arm   Patient Position: Sitting   Cuff Size: Adult   Pulse: 76   Resp: 15   SpO2: 97%   Weight: (!) 138 kg (304 lb)   Height: 167.6 cm (66\")     Body mass index is 49.07 kg/m².   Class 3 Severe Obesity (BMI >=40). Obesity-related health conditions include the following: diabetes mellitus. Obesity is unchanged. BMI is is above average; BMI management plan is completed. We discussed low calorie, low carb based diet program, portion control, and increasing exercise.       Physical Exam  Constitutional:       General: She is not in acute distress.     Appearance: She is obese.   Neurological:      Mental Status: She is alert and oriented to person, place, and time.   Psychiatric:         Mood and Affect: Mood normal.         Behavior: Behavior normal.         Thought Content: Thought content normal.         Judgment: Judgment normal.         Procedures    Assessment / Plan      Assessment/Plan:   Diagnoses and all orders for this visit:    1. Type 2 diabetes mellitus with hyperglycemia, with long-term current use of insulin (Primary)  Assessment & Plan:  Diabetes has been poorly controlled for the past few years.  I discussed with patient the importance of keeping her appointments and complying with treatment.  Will go ahead and start Mounjaro planning on tapering to the 7.5 mg dose over 3 months.  Patient to follow-up here in about 10 weeks.  Will go ahead and get labs today and plan on a general medical exam at follow-up.    Orders:  -     POC Glycosylated Hemoglobin (Hb A1C)  -     Tirzepatide (Mounjaro) 2.5 MG/0.5ML solution pen-injector pen; Inject 0.5 mL under the skin into the appropriate area as directed 1 (One) Time Per Week. Fill this dose first.  Dispense: 2 mL; Refill: 0  -     Tirzepatide (Mounjaro) 5 MG/0.5ML solution pen-injector pen; Inject 0.5 mL under the skin into the appropriate area as directed 1 (One) Time Per Week. " Fill this dose after finishing 2.5 mg.  Dispense: 2 mL; Refill: 0  -     Tirzepatide (Mounjaro) 7.5 MG/0.5ML solution pen-injector pen; Inject 0.5 mL under the skin into the appropriate area as directed 1 (One) Time Per Week. Fill after 5 mg dose.  Dispense: 2 mL; Refill: 0  -     POC Microalbumin    2. Moderate episode of recurrent major depressive disorder  Assessment & Plan:  Has not done well on SSRIs in the past.  Has a history of a couple of psych hospitalizations.  Also has ADHD per patient report.  Will go ahead and refer to behavioral health.    Orders:  -     Ambulatory Referral to Psychiatry    3. Attention deficit hyperactivity disorder (ADHD), unspecified ADHD type    4. Need for hepatitis C screening test  -     HCV Antibody Rfx To Qnt PCR    5. Screening for hyperlipidemia  -     Lipid Panel    6. General medical exam  -     POC Glycosylated Hemoglobin (Hb A1C)  -     Comprehensive Metabolic Panel  -     Vitamin B12  -     Folate  -     Lipid Panel  -     CK  -     TSH  -     T4, Free  -     CBC Auto Differential  -     HCV Antibody Rfx To Qnt PCR    7. Infertility, female  Assessment & Plan:  I had a discussion with patient about not being in such a hurry right now about getting pregnant.  She really needs to work on getting her diabetes control to have a healthy pregnancy and a healthy baby.  Working on controlling her diabetes and losing some weight may also help improve her fertility.            Medications:     Current Outpatient Medications:     Progesterone (PROMETRIUM) 100 MG capsule, Take 1 capsule by mouth Daily., Disp: , Rfl:     Tirzepatide (Mounjaro) 2.5 MG/0.5ML solution pen-injector pen, Inject 0.5 mL under the skin into the appropriate area as directed 1 (One) Time Per Week. Fill this dose first., Disp: 2 mL, Rfl: 0    Tirzepatide (Mounjaro) 5 MG/0.5ML solution pen-injector pen, Inject 0.5 mL under the skin into the appropriate area as directed 1 (One) Time Per Week. Fill this dose  after finishing 2.5 mg., Disp: 2 mL, Rfl: 0    Tirzepatide (Mounjaro) 7.5 MG/0.5ML solution pen-injector pen, Inject 0.5 mL under the skin into the appropriate area as directed 1 (One) Time Per Week. Fill after 5 mg dose., Disp: 2 mL, Rfl: 0    I spent 40 minutes caring for Priti on this date of service. This time includes time spent by me in the following activities:preparing for the visit, reviewing tests, obtaining and/or reviewing a separately obtained history, performing a medically appropriate examination and/or evaluation , counseling and educating the patient/family/caregiver, ordering medications, tests, or procedures, referring and communicating with other health care professionals , and documenting information in the medical record    Follow Up:   Return in about 10 weeks (around 6/21/2024) for Annual physical.    Angela Dunn PA-C   [facility]     NOTE TO PATIENT: The 21st Century Cures Act makes medical notes like these available to patients in the interest of transparency. However, be advised this is a medical document. It is intended as peer to peer communication. It is written in medical language and may contain abbreviations or verbiage that are unfamiliar. It may appear blunt or direct. Medical documents are intended to carry relevant information, facts as evident, and the clinical opinion of the practitioner.

## 2024-04-13 LAB
ALBUMIN SERPL-MCNC: 4.3 G/DL (ref 4–5)
ALBUMIN/GLOB SERPL: 1.4 {RATIO} (ref 1.2–2.2)
ALP SERPL-CCNC: 93 IU/L (ref 44–121)
ALT SERPL-CCNC: 37 IU/L (ref 0–32)
AST SERPL-CCNC: 26 IU/L (ref 0–40)
BASOPHILS # BLD AUTO: 0.1 X10E3/UL (ref 0–0.2)
BASOPHILS NFR BLD AUTO: 1 %
BILIRUB SERPL-MCNC: 0.5 MG/DL (ref 0–1.2)
BUN SERPL-MCNC: 11 MG/DL (ref 6–20)
BUN/CREAT SERPL: 22 (ref 9–23)
CALCIUM SERPL-MCNC: 9.3 MG/DL (ref 8.7–10.2)
CHLORIDE SERPL-SCNC: 100 MMOL/L (ref 96–106)
CHOLEST SERPL-MCNC: 195 MG/DL (ref 100–199)
CK SERPL-CCNC: 65 U/L (ref 32–182)
CO2 SERPL-SCNC: 23 MMOL/L (ref 20–29)
CREAT SERPL-MCNC: 0.49 MG/DL (ref 0.57–1)
EGFRCR SERPLBLD CKD-EPI 2021: 133 ML/MIN/1.73
EOSINOPHIL # BLD AUTO: 0.2 X10E3/UL (ref 0–0.4)
EOSINOPHIL NFR BLD AUTO: 2 %
ERYTHROCYTE [DISTWIDTH] IN BLOOD BY AUTOMATED COUNT: 12.7 % (ref 11.7–15.4)
FOLATE SERPL-MCNC: 9.2 NG/ML
GLOBULIN SER CALC-MCNC: 3.1 G/DL (ref 1.5–4.5)
GLUCOSE SERPL-MCNC: 265 MG/DL (ref 70–99)
HCT VFR BLD AUTO: 42.6 % (ref 34–46.6)
HCV AB SERPL QL IA: NORMAL
HCV IGG SERPL QL IA: NON REACTIVE
HDLC SERPL-MCNC: 41 MG/DL
HGB BLD-MCNC: 13.7 G/DL (ref 11.1–15.9)
IMM GRANULOCYTES # BLD AUTO: 0 X10E3/UL (ref 0–0.1)
IMM GRANULOCYTES NFR BLD AUTO: 0 %
LDLC SERPL CALC-MCNC: 118 MG/DL (ref 0–99)
LYMPHOCYTES # BLD AUTO: 2.4 X10E3/UL (ref 0.7–3.1)
LYMPHOCYTES NFR BLD AUTO: 25 %
MCH RBC QN AUTO: 28.7 PG (ref 26.6–33)
MCHC RBC AUTO-ENTMCNC: 32.2 G/DL (ref 31.5–35.7)
MCV RBC AUTO: 89 FL (ref 79–97)
MONOCYTES # BLD AUTO: 0.4 X10E3/UL (ref 0.1–0.9)
MONOCYTES NFR BLD AUTO: 4 %
NEUTROPHILS # BLD AUTO: 6.3 X10E3/UL (ref 1.4–7)
NEUTROPHILS NFR BLD AUTO: 68 %
PLATELET # BLD AUTO: 259 X10E3/UL (ref 150–450)
POTASSIUM SERPL-SCNC: 3.9 MMOL/L (ref 3.5–5.2)
PROT SERPL-MCNC: 7.4 G/DL (ref 6–8.5)
RBC # BLD AUTO: 4.78 X10E6/UL (ref 3.77–5.28)
SODIUM SERPL-SCNC: 137 MMOL/L (ref 134–144)
T4 FREE SERPL-MCNC: 1.58 NG/DL (ref 0.82–1.77)
TRIGL SERPL-MCNC: 204 MG/DL (ref 0–149)
TSH SERPL DL<=0.005 MIU/L-ACNC: 2.08 UIU/ML (ref 0.45–4.5)
VIT B12 SERPL-MCNC: 501 PG/ML (ref 232–1245)
VLDLC SERPL CALC-MCNC: 36 MG/DL (ref 5–40)
WBC # BLD AUTO: 9.4 X10E3/UL (ref 3.4–10.8)

## 2024-04-15 NOTE — PROGRESS NOTES
Labs were all stable except for the high blood sugar.  Please keep any follow-up visits that were recommended during your recent visit.

## 2024-05-02 ENCOUNTER — TELEPHONE (OUTPATIENT)
Dept: FAMILY MEDICINE CLINIC | Facility: CLINIC | Age: 29
End: 2024-05-02

## 2024-05-02 RX ORDER — BUPROPION HYDROCHLORIDE 150 MG/1
150 TABLET ORAL DAILY
Qty: 30 TABLET | Refills: 0 | Status: SHIPPED | OUTPATIENT
Start: 2024-05-02

## 2024-05-02 NOTE — TELEPHONE ENCOUNTER
Caller: Priti Osman    Relationship: Self    Best call back number:     [phone]       Requested Prescriptions:   WELLBUTRIN       Pharmacy where request should be sent:  [facility]/pharmacy #[id number] - [place] - [address] [phone]  - [phone] FX        Last office visit with prescribing clinician: 4/12/2024   Last telemedicine visit with prescribing clinician: Visit date not found   Next office visit with prescribing clinician: Visit date not found     PT STATED THAT SHE IS OUT OF RX, AND THAT IT WAS SUPPOSE TO BE REFILLED THIS A.M      Does the patient have less than a 3 day supply:  [x] Yes  [] No    Would you like a call back once the refill request has been completed: [] Yes [x] No    If the office needs to give you a call back, can they leave a voicemail: [] Yes [x] No    Kenn Roque Rep   05/02/24 13:04 EDT

## 2024-05-02 NOTE — TELEPHONE ENCOUNTER
HUB TO RELAY    TRIED TO LEAVE PT A VM HOWEVER HER VM IS NOT SET UP. PT APPOINTMENT TODAY 5/2/24 AT 3:30 NEEDS TO BE RESCHEDULED BECAUSE IT DOES NOT FIX SAME DAY CRITERIA. PT NEEDS TO BE SCHEDULED AT NEXT AVAILABLE WITH BRITTANY OR ANY PROVIDER.

## 2024-05-08 ENCOUNTER — TELEMEDICINE (OUTPATIENT)
Dept: PSYCHIATRY | Facility: CLINIC | Age: 29
End: 2024-05-08
Payer: COMMERCIAL

## 2024-05-08 DIAGNOSIS — E11.65 TYPE 2 DIABETES MELLITUS WITH HYPERGLYCEMIA, WITH LONG-TERM CURRENT USE OF INSULIN: ICD-10-CM

## 2024-05-08 DIAGNOSIS — F33.3 SEVERE RECURRENT MAJOR DEPRESSION WITH PSYCHOTIC FEATURES: Primary | ICD-10-CM

## 2024-05-08 DIAGNOSIS — Z79.4 TYPE 2 DIABETES MELLITUS WITH HYPERGLYCEMIA, WITH LONG-TERM CURRENT USE OF INSULIN: ICD-10-CM

## 2024-05-08 DIAGNOSIS — F43.10 POST TRAUMATIC STRESS DISORDER (PTSD): ICD-10-CM

## 2024-05-08 RX ORDER — BUPROPION HYDROCHLORIDE 150 MG/1
TABLET ORAL
Qty: 30 TABLET | Refills: 2 | Status: SHIPPED | OUTPATIENT
Start: 2024-05-08

## 2024-05-08 RX ORDER — HYDROXYZINE PAMOATE 50 MG/1
CAPSULE ORAL
COMMUNITY
Start: 2024-05-06

## 2024-05-08 RX ORDER — TIRZEPATIDE 2.5 MG/.5ML
INJECTION, SOLUTION SUBCUTANEOUS
OUTPATIENT
Start: 2024-05-08

## 2024-05-09 ENCOUNTER — TELEPHONE (OUTPATIENT)
Dept: PSYCHIATRY | Facility: CLINIC | Age: 29
End: 2024-05-09
Payer: COMMERCIAL

## 2024-05-09 NOTE — TELEPHONE ENCOUNTER
Check with [employer]. I believe there is a fee for completion of the forms. I am out of the office until Monday so it will be next week before I can address this. Thanks.

## 2024-05-09 NOTE — TELEPHONE ENCOUNTER
Pt states that her employer is needing the leave of absence paperwork filled out.  Pt is unsure if our office is to fill out the paperwork.    Please Advise

## 2024-05-09 NOTE — TELEPHONE ENCOUNTER
Pt states her PCP is what caused all of this anyway.  Pt states she is looking for a new PCP.  Pt states she didn't know anything about the paperwork yesterday and she will lose her job if she doesn't have the paperwork filled out.

## 2024-05-14 NOTE — TELEPHONE ENCOUNTER
Called pt to check to see if she still needed paperwork filled out.  Pt states she had the PCP fill out the paperwork.

## 2024-05-23 ENCOUNTER — TELEPHONE (OUTPATIENT)
Dept: PSYCHIATRY | Facility: CLINIC | Age: 29
End: 2024-05-23
Payer: COMMERCIAL

## 2024-05-23 NOTE — TELEPHONE ENCOUNTER
Pt called wanting to know can you provide a letter for medical leave to move to [place] to give to homeowner for the lease.

## 2024-06-11 ENCOUNTER — TELEMEDICINE (OUTPATIENT)
Dept: PSYCHIATRY | Facility: CLINIC | Age: 29
End: 2024-06-11
Payer: COMMERCIAL

## 2024-06-11 DIAGNOSIS — F33.3 SEVERE RECURRENT MAJOR DEPRESSION WITH PSYCHOTIC FEATURES: Primary | ICD-10-CM

## 2024-06-11 DIAGNOSIS — F43.10 POST TRAUMATIC STRESS DISORDER (PTSD): ICD-10-CM

## 2024-06-11 RX ORDER — ARIPIPRAZOLE 2 MG/1
2 TABLET ORAL DAILY
Qty: 30 TABLET | Refills: 1 | Status: SHIPPED | OUTPATIENT
Start: 2024-06-11
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
## 2024-06-11 NOTE — PATIENT INSTRUCTIONS
For concerns or needing assistance call the Behavioral Health [facility] at [phone]  Should you ever need assistance or just want to reach out to someone when your behavioral health provider is not available due to the office being closed you can contact [email or web address]. Just text HOME to [phone] and someone will reach out to you within a few minutes.  This is a 24/7 help line and they are open holidays. Be sure and let us know as soon as our office opens so we can get you in for a follow up.  As always, go to the closest emergency room or call 911 if you feel you need immediate assistance.    Continue Wellbutrin 150 mg tablet extended release take 1 tablet every morning  Continue with hydroxyzine 50 mg 3 times daily as needed for anxiety  START abilify 2mg daily  Monitor blood sugar level  Keep appt with PCP

## 2024-06-24 ENCOUNTER — TELEMEDICINE (OUTPATIENT)
Dept: PSYCHIATRY | Facility: CLINIC | Age: 29
End: 2024-06-24
Payer: COMMERCIAL

## 2024-06-24 DIAGNOSIS — F33.3 SEVERE RECURRENT MAJOR DEPRESSION WITH PSYCHOTIC FEATURES: Primary | ICD-10-CM

## 2024-06-24 DIAGNOSIS — F43.10 POST TRAUMATIC STRESS DISORDER (PTSD): ICD-10-CM

## 2024-06-24 PROCEDURE — 90791 PSYCH DIAGNOSTIC EVALUATION: CPT | Performed by: SOCIAL WORKER
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
## 2024-06-27 ENCOUNTER — TELEMEDICINE (OUTPATIENT)
Dept: PSYCHIATRY | Facility: CLINIC | Age: 29
End: 2024-06-27
Payer: COMMERCIAL

## 2024-06-27 DIAGNOSIS — F33.3 SEVERE RECURRENT MAJOR DEPRESSION WITH PSYCHOTIC FEATURES: Primary | ICD-10-CM

## 2024-06-27 DIAGNOSIS — F43.10 POST TRAUMATIC STRESS DISORDER (PTSD): ICD-10-CM

## 2024-06-27 RX ORDER — BUPROPION HYDROCHLORIDE 300 MG/1
TABLET ORAL
Qty: 30 TABLET | Refills: 2 | Status: SHIPPED | OUTPATIENT
Start: 2024-06-27

## 2024-06-27 NOTE — PATIENT INSTRUCTIONS
For concerns or needing assistance call the [facility] at [phone]  Should you ever need assistance or just want to reach out to someone when your behavioral health provider is not available due to the office being closed you can contact [email or web address]. Just text HOME to [phone] and someone will reach out to you within a few minutes.  This is a 24/7 help line and they are open holidays. Be sure and let us know as soon as our office opens so we can get you in for a follow up.  As always, go to the closest emergency room or call 911 if you feel you need immediate assistance.    Increase Wellbutrin to 300 mg tablet extended release take 1 tablet every morning  Continue with hydroxyzine as needed  Keep appt with PCP
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
